# Patient Record
Sex: FEMALE | Race: WHITE | NOT HISPANIC OR LATINO | Employment: OTHER | URBAN - NONMETROPOLITAN AREA
[De-identification: names, ages, dates, MRNs, and addresses within clinical notes are randomized per-mention and may not be internally consistent; named-entity substitution may affect disease eponyms.]

---

## 2017-07-10 ENCOUNTER — TELEPHONE (OUTPATIENT)
Dept: CARDIOLOGY | Facility: CLINIC | Age: 57
End: 2017-07-10

## 2017-07-11 ENCOUNTER — OFFICE VISIT (OUTPATIENT)
Dept: CARDIOLOGY | Facility: CLINIC | Age: 57
End: 2017-07-11

## 2017-07-11 VITALS
HEART RATE: 54 BPM | BODY MASS INDEX: 26.67 KG/M2 | DIASTOLIC BLOOD PRESSURE: 85 MMHG | OXYGEN SATURATION: 98 % | HEIGHT: 64 IN | WEIGHT: 156.2 LBS | SYSTOLIC BLOOD PRESSURE: 139 MMHG

## 2017-07-11 DIAGNOSIS — I25.10 CORONARY ARTERY DISEASE INVOLVING NATIVE CORONARY ARTERY OF NATIVE HEART WITHOUT ANGINA PECTORIS: Primary | ICD-10-CM

## 2017-07-11 DIAGNOSIS — I10 ESSENTIAL HYPERTENSION: ICD-10-CM

## 2017-07-11 PROCEDURE — 99213 OFFICE O/P EST LOW 20 MIN: CPT | Performed by: NURSE PRACTITIONER

## 2017-07-11 RX ORDER — ASPIRIN 325 MG
325 TABLET, DELAYED RELEASE (ENTERIC COATED) ORAL DAILY
Refills: 5 | COMMUNITY
Start: 2017-06-07 | End: 2018-02-20 | Stop reason: DRUGHIGH

## 2017-07-11 NOTE — PATIENT INSTRUCTIONS
Atherosclerosis  Atherosclerosis is narrowing and hardening of the blood vessels (arteries). Arteries are tubes that carry blood that contains oxygen from the heart to all parts of the body. Arteries can become narrow or clogged with a buildup of fat, cholesterol, calcium, or other substances (plaque). Plaque decreases the amount of blood that can flow through the artery.  Atherosclerosis can affect any artery in the body, including:  · Heart arteries (coronary artery disease), which may cause heart attack.  · Brain arteries, which may cause stroke.  · Leg, arm, and pelvis arteries (peripheral artery disease), which may cause pain and numbness.  · Kidney arteries, which may cause kidney (renal) failure.  Treatment may slow the disease and prevent further damage to the heart, brain, peripheral arteries, and kidneys.  CAUSES  Atherosclerosis develops when plaque forms in an artery. This damages the inside wall of the artery. Over time, the plaque grows and hardens. It may break through the artery wall. This causes a blood clot to form over the break, which narrows the artery more. The clot may also break loose and travel to other arteries, causing more damage.  RISK FACTORS  This condition is more likely to develop in people who:  · Are middle age or older.  · Have a family history of atherosclerosis.  · Have high cholesterol.  · Have high blood fats (triglycerides).  · Have diabetes.  · Are overweight.  · Smoke tobacco.  · Do not exercise enough.  · Have a substance in the blood that indicates increased levels of inflammation in the body (C-reactive protein, or CRP).  · Have sleep apnea.  · Are stressed.  · Drink too much alcohol.  SYMPTOMS  This condition may not cause any symptoms. If you do have symptoms, they are caused by damage to an area of your body that is not getting enough blood. The following symptoms are possible:  · Coronary artery disease may cause chest pain and shortness of breath.  · Decreased blood  "supply to your brain may cause a stroke. Signs and symptoms of stroke may include sudden:    Weakness on one side of the body.    Confusion.    Changes in vision.    Inability to speak or understand speech.    Loss of balance, coordination, or ability to walk.    Severe headache.    Loss of consciousness.  · Peripheral artery disease may cause pain and numbness, often in the legs and hips.  · Renal failure may cause fatigue, nausea, swelling, and itchy skin.  DIAGNOSIS  This condition is diagnosed based on your medical history and a physical exam. During the exam, your health care provider will check your pulses and listen for a \"whooshing\" sound over your arteries (bruit). You may have tests, such as:  · Blood tests to check your levels of cholesterol, triglycerides, and CRP.  · Electrocardiogram (ECG) to check for heart damage.  · Chest X-ray to see if your heart is enlarged, which is a sign of heart failure.  · Stress test to see how your heart reacts to exercise.  · Echocardiogram to get images of the inside of your heart.  · Ankle-Brachial index to compare blood pressure in your arms to blood pressure in your ankles.  · Ultrasound of your peripheral arteries to check blood flow.  · CT scan to check for damage to your heart or brain.  · X-rays of blood vessels after dye has been injected (angiogram) to check blood flow.  TREATMENT  Treatment starts with lifestyle changes, which may include:  · Changing your diet.  · Losing weight.  · Reducing stress.  · Exercising and being more physically active.  · Not smoking.  You also may need medicine to:  · Lower triglycerides and cholesterol.  · Lower and control blood pressure.  · Prevent blood clots.  · Lower inflammation in your body.  · Lower and control your blood sugar.  Sometimes, surgery is needed to remove plaque, widen your artery, or create a new path for your blood (bypass). Surgical treatment may include:  · Removing plaque from an artery " (endarterectomy).  · Opening a narrowed heart artery (angioplasty).  · Heart or peripheral artery bypass graft surgery.  HOME CARE INSTRUCTIONS  Lifestyle  · Eat a heart-healthy diet. Talk to your health care provider or a diet specialist (dietitian) if you need help. A heart-healthy diet includes:    Limiting unhealthy fats and increasing healthy fats. Some examples of healthy fats are olive oil and canola oil.    Eating plant-based foods, such as fruits, vegetables, nuts, legumes, and whole grains.  · Follow an exercise program as told by your health care provider.  · Maintain a healthy weight. Lose weight if directed by your health care provider.  · Rest when you are tired.  · Learn to manage your stress.  · Do not use any tobacco products, such as cigarettes, chewing tobacco, and e-cigarettes. If you need help quitting, ask your health care provider.  · Limit alcohol intake to no more than 1 drink a day for nonpregnant women and 2 drinks a day for men. One drink equals 12 oz of beer, 5 oz of wine, or 1½ oz of hard liquor.  · Do not abuse drugs.  General Instructions  · Take over-the-counter and prescription medicines only as told by your health care provider.  · Manage other health conditions as told by your health care provider.  · Keep all follow-up visits as told by your health care provider. This is important.  SEEK MEDICAL CARE IF:  · You have chest pain or discomfort. This includes squeezing chest pain that may feel like indigestion (angina).  · You have shortness of breath.  · You have an irregular heartbeat.  · You have unexplained fatigue.  · You have unexplained pain or numbness in an arm, leg, or hip.  · You have nausea, swelling of your hands or feet, and itchy skin.  SEEK IMMEDIATE MEDICAL CARE IF:  · You have symptoms of a heart attack, such as:    Chest pain.    Shortness of breath.    Pain in your neck, jaw, arms, back, or stomach.    Cold sweat.    Nausea.    Light-headedness.  · You have  symptoms of a stroke, such as sudden:    Weakness on one side of your body.    Confusion.    Changes in vision.    Inability to speak or understand speech.    Loss of balance, coordination, or ability to walk.    Severe headache.    Loss of consciousness.  These symptoms may represent a serious problem that is an emergency. Do not wait to see if the symptoms will go away. Get medical help right away. Call your local emergency services (911 in the [place] Do not drive yourself to the hospital.     This information is not intended to replace advice given to you by your health care provider. Make sure you discuss any questions you have with your health care provider.     Document Released: 03/09/2005 Document Revised: 04/10/2017 Document Reviewed: 11/07/2016  [employer] Interactive Patient Education ©2017 [employer]  Heart-Healthy Eating Plan  Many factors influence your heart health, including eating and exercise habits. Heart (coronary) risk increases with abnormal blood fat (lipid) levels. Heart-healthy meal planning includes limiting unhealthy fats, increasing healthy fats, and making other small dietary changes. This includes maintaining a healthy body weight to help keep lipid levels within a normal range.  WHAT IS MY PLAN?   Your health care provider recommends that you:  · Get no more than _________% of the total calories in your daily diet from fat.  · Limit your intake of saturated fat to less than _________% of your total calories each day.  · Limit the amount of cholesterol in your diet to less than _________ mg per day.  WHAT TYPES OF FAT SHOULD I CHOOSE?  · Choose healthy fats more often. Choose monounsaturated and polyunsaturated fats, such as olive oil and canola oil, flaxseeds, walnuts, almonds, and seeds.  · Eat more omega-3 fats. Good choices include salmon, mackerel, sardines, tuna, flaxseed oil, and ground flaxseeds. Aim to eat fish at least two times each week.  · Limit saturated fats. Saturated  "fats are primarily found in animal products, such as meats, butter, and cream. Plant sources of saturated fats include palm oil, palm kernel oil, and coconut oil.  · Avoid foods with partially hydrogenated oils in them. These contain trans fats. Examples of foods that contain trans fats are stick margarine, some tub margarines, cookies, crackers, and other baked goods.  WHAT GENERAL GUIDELINES DO I NEED TO FOLLOW?  · Check food labels carefully to identify foods with trans fats or high amounts of saturated fat.  · Fill one half of your plate with vegetables and green salads. Eat 4-5 servings of vegetables per day. A serving of vegetables equals 1 cup of raw leafy vegetables, ½ cup of raw or cooked cut-up vegetables, or ½ cup of vegetable juice.  · Fill one fourth of your plate with whole grains. Look for the word \"whole\" as the first word in the ingredient list.  · Fill one fourth of your plate with lean protein foods.  · Eat 4-5 servings of fruit per day. A serving of fruit equals one medium whole fruit, ¼ cup of dried fruit, ½ cup of fresh, frozen, or canned fruit, or ½ cup of 100% fruit juice.  · Eat more foods that contain soluble fiber. Examples of foods that contain this type of fiber are apples, broccoli, carrots, beans, peas, and barley. Aim to get 20-30 g of fiber per day.  · Eat more home-cooked food and less restaurant, buffet, and fast food.  · Limit or avoid alcohol.  · Limit foods that are high in starch and sugar.  · Avoid fried foods.  · Cook foods by using methods other than frying. Baking, boiling, grilling, and broiling are all great options. Other fat-reducing suggestions include:    Removing the skin from poultry.    Removing all visible fats from meats.    Skimming the fat off of stews, soups, and gravies before serving them.    Steaming vegetables in water or broth.  · Lose weight if you are overweight. Losing just 5-10% of your initial body weight can help your overall health and prevent " diseases such as diabetes and heart disease.  · Increase your consumption of nuts, legumes, and seeds to 4-5 servings per week. One serving of dried beans or legumes equals ½ cup after being cooked, one serving of nuts equals 1½ ounces, and one serving of seeds equals ½ ounce or 1 tablespoon.  · You may need to monitor your salt (sodium) intake, especially if you have high blood pressure. Talk with your health care provider or dietitian to get more information about reducing sodium.  WHAT FOODS CAN I EAT?  Grains  Breads, including Serbian, white, vianca, wheat, raisin, rye, oatmeal, and Italian. Tortillas that are neither fried nor made with lard or trans fat. Low-fat rolls, including hotdog and hamburger buns and English muffins. Biscuits. Muffins. Waffles. Pancakes. Light popcorn. Whole-grain cereals. Flatbread. [place] toast. Pretzels. Breadsticks. Rusks. Low-fat snacks and crackers, including oyster, saltine, matzo, selin, animal, and rye. Rice and pasta, including brown rice and those that are made with whole wheat.  Vegetables  All vegetables.  Fruits  All fruits, but limit coconut.  Meats and Other Protein Sources  Lean, well-trimmed beef, veal, pork, and lamb. Chicken and turkey without skin. All fish and shellfish. Wild duck, rabbit, pheasant, and venison. Egg whites or low-cholesterol egg substitutes. Dried beans, peas, lentils, and tofu. Seeds and most nuts.  Dairy  Low-fat or nonfat cheeses, including ricotta, string, and mozzarella. Skim or 1% milk that is liquid, powdered, or evaporated. Buttermilk that is made with low-fat milk. Nonfat or low-fat yogurt.  Beverages  Mineral water. Diet carbonated beverages.  Sweets and Desserts  Sherbets and fruit ices. Honey, jam, marmalade, jelly, and syrups. Meringues and gelatins. Pure sugar candy, such as hard candy, jelly beans, gumdrops, mints, marshmallows, and small amounts of dark chocolate. Jose food cake.  Eat all sweets and desserts in moderation.  Fats and  Oils  Nonhydrogenated (trans-free) margarines. Vegetable oils, including soybean, sesame, sunflower, olive, peanut, safflower, corn, canola, and cottonseed. Salad dressings or mayonnaise that are made with a vegetable oil. Limit added fats and oils that you use for cooking, baking, salads, and as spreads.  Other  Cocoa powder. Coffee and tea. All seasonings and condiments.  The items listed above may not be a complete list of recommended foods or beverages. Contact your dietitian for more options.  WHAT FOODS ARE NOT RECOMMENDED?  Grains  Breads that are made with saturated or trans fats, oils, or whole milk. Croissants. Butter rolls. Cheese breads. Sweet rolls. Donuts. Buttered popcorn. Chow mein noodles. High-fat crackers, such as cheese or butter crackers.  Meats and Other Protein Sources  Fatty meats, such as hotdogs, short ribs, sausage, spareribs, mo, ribeye roast or steak, and mutton. High-fat deli meats, such as salami and bologna. Caviar. Domestic duck and goose. Organ meats, such as kidney, liver, sweetbreads, brains, gizzard, chitterlings, and heart.  Dairy  Cream, sour cream, cream cheese, and creamed cottage cheese. Whole milk cheeses, including blue (trey), Swift Lalo, Brie, Ashu, American, Havarti, Swiss, cheddar, Camembert, and Alvin.  Whole or 2% milk that is liquid, evaporated, or condensed. Whole buttermilk. Cream sauce or high-fat cheese sauce. Yogurt that is made from whole milk.  Beverages  Regular sodas and drinks with added sugar.  Sweets and Desserts  Frosting. Pudding. Cookies. Cakes other than yoshi food cake. Candy that has milk chocolate or white chocolate, hydrogenated fat, butter, coconut, or unknown ingredients. Buttered syrups. Full-fat ice cream or ice cream drinks.  Fats and Oils  Gravy that has suet, meat fat, or shortening. Cocoa butter, hydrogenated oils, palm oil, coconut oil, palm kernel oil. These can often be found in baked products, candy, fried foods, nondairy  creamers, and whipped toppings. Solid fats and shortenings, including mo fat, salt pork, lard, and butter. Nondairy cream substitutes, such as coffee creamers and sour cream substitutes. Salad dressings that are made of unknown oils, cheese, or sour cream.  The items listed above may not be a complete list of foods and beverages to avoid. Contact your dietitian for more information.     This information is not intended to replace advice given to you by your health care provider. Make sure you discuss any questions you have with your health care provider.     Document Released: 09/26/2009 Document Revised: 01/08/2016 Document Reviewed: 06/11/2015  [employer] Interactive Patient Education ©2017 [employer]

## 2017-07-11 NOTE — PROGRESS NOTES
Subjective     Chief Complaint: Follow-up and Coronary Artery Disease    History of Present Illness   Cassy Andrew is a 57 y.o. female who presents for follow up for ASCVD.  She had an MI with incomplete LBBB in May 2015, subsequent LCA stent, performed by Dr Ashley.   Echo revealed EF of 55-60% s/p stenting.  She has a history of hypertension which is stable.  She denies CP at rest and CP with exertion.  She denies SOA, HARVEY.  She denies episodes of dizziness.  She denies swelling.  She denies blood in stool.  She exercises regularly, walking 2-4 miles daily and working out at the gym with weights.  She reports taking her medication as directed.      She has come to the office today with a copy of her most recent labs ordered by her PCP on March 2, 2017.  Her H/H was 14.1/41.9.  Lipid panel shows , Triglycerides 147, HDL 49, LDL 77.  Her BUN/Creatinine was 17/1.04.  Ms Andrew's fasting blood glucose was 121 and her A1c was 6.3.  According to Ms Andrew, she asked her PCP to wait to be placed on Metformin.  She wants to see if an increase in exercise, diet change, and decrease in stress will bring the glucose numbers down.    See ROS      Current Outpatient Prescriptions:   •  atorvastatin (LIPITOR) 40 MG tablet, Take 1 tablet by mouth Daily., Disp: 30 tablet, Rfl: 11  •  clopidogrel (PLAVIX) 75 MG tablet, Take 1 tablet by mouth Daily., Disp: 30 tablet, Rfl: 11  •  lisinopril (PRINIVIL,ZESTRIL) 10 MG tablet, Take 1 tablet by mouth Daily., Disp: 30 tablet, Rfl: 11  •  metoprolol tartrate (LOPRESSOR) 25 MG tablet, Take 1 tablet by mouth 2 (Two) Times a Day., Disp: 60 tablet, Rfl: 11  •  aspirin  MG tablet, Take 325 mg by mouth Daily., Disp: , Rfl: 5     The following portions of the patient's history were reviewed and updated as appropriate: allergies, current medications, past family history, past medical history, past social history, past surgical history and problem list.    Review of Systems    Constitutional: Negative for fatigue.   Respiratory: Negative for shortness of breath.    Cardiovascular: Negative for chest pain, palpitations and leg swelling.   Gastrointestinal: Negative for blood in stool.   Neurological: Negative for dizziness, syncope, weakness and light-headedness.   Hematological: Bruises/bleeds easily (Bruises more easily than before being placed on Plavix.  ).       Objective   Physical Exam   Constitutional: She appears well-developed and well-nourished.   HENT:   Head: Normocephalic and atraumatic.   Eyes: Pupils are equal, round, and reactive to light.   Neck: No JVD present.   Cardiovascular: Regular rhythm, S1 normal, S2 normal and intact distal pulses.  Bradycardia present.  Exam reveals no gallop and no friction rub.    No murmur heard.  Pulmonary/Chest: Effort normal and breath sounds normal. No respiratory distress. She has no wheezes. She has no rales.   Abdominal: Soft. She exhibits no mass. There is no tenderness. No hernia.   Skin: Skin is warm and dry.   Psychiatric: She has a normal mood and affect.       Procedures     5/13/2015 echo per Dr. Ashley  1.)  Normal LV cavity size, wall  ismotion and ejection fraction EF 55-60%.    2.)  No significant valvular disease   3.)  No intracardiac masses observed   4.)  No thrombus is visualized within the left ventricle   5.)  There is no pericardial effusion    5/16/2016 Nuclear Stress test per Dr. Ashley  1.)  Abnormal Lexiscan nuclear stress test  2.) There was a medium, moderate partially reversible defect in the basal anterior septal, mid anterior septal, apical septal segments.  3.) There was a small fixed moderate defect in the mid inferior septal segment.  4.) Gated imaging underpass stress conditions demonstrated mild hypokinesis of the mid inferior septal segment; moderate hypokinesis of the basal inferior septal segment.    Assessment/Plan   Problems Addressed this Visit        Cardiovascular and Mediastinum    CAD  (coronary artery disease) - Primary    Hypertension        Pt to continue on medications as prescribed.  Pt encouraged to continue exercise and diet modifications.  Defer pre-diabetes management to PCP.  May decrease ASA to 81 mg.    Follow up in 6 months

## 2017-10-09 RX ORDER — CLOPIDOGREL BISULFATE 75 MG/1
75 TABLET ORAL DAILY
Qty: 30 TABLET | Refills: 11 | Status: SHIPPED | OUTPATIENT
Start: 2017-10-09 | End: 2018-09-08 | Stop reason: SDUPTHER

## 2017-10-16 RX ORDER — ATORVASTATIN CALCIUM 40 MG/1
40 TABLET, FILM COATED ORAL DAILY
Qty: 30 TABLET | Refills: 11 | Status: SHIPPED | OUTPATIENT
Start: 2017-10-16 | End: 2018-09-08 | Stop reason: SDUPTHER

## 2017-11-02 RX ORDER — LISINOPRIL 10 MG/1
10 TABLET ORAL DAILY
Qty: 30 TABLET | Refills: 11 | Status: SHIPPED | OUTPATIENT
Start: 2017-11-02 | End: 2018-10-11 | Stop reason: SDUPTHER

## 2018-02-20 ENCOUNTER — OFFICE VISIT (OUTPATIENT)
Dept: CARDIOLOGY | Facility: CLINIC | Age: 58
End: 2018-02-20

## 2018-02-20 VITALS — BODY MASS INDEX: 24.7 KG/M2 | WEIGHT: 153.7 LBS | HEIGHT: 66 IN

## 2018-02-20 DIAGNOSIS — I10 ESSENTIAL HYPERTENSION: ICD-10-CM

## 2018-02-20 DIAGNOSIS — I25.10 CORONARY ARTERY DISEASE INVOLVING NATIVE CORONARY ARTERY OF NATIVE HEART WITHOUT ANGINA PECTORIS: Primary | ICD-10-CM

## 2018-02-20 PROCEDURE — 93000 ELECTROCARDIOGRAM COMPLETE: CPT | Performed by: NURSE PRACTITIONER

## 2018-02-20 PROCEDURE — 99213 OFFICE O/P EST LOW 20 MIN: CPT | Performed by: NURSE PRACTITIONER

## 2018-02-20 RX ORDER — ASPIRIN 81 MG/1
81 TABLET ORAL DAILY
COMMUNITY

## 2018-02-20 NOTE — PROGRESS NOTES
Subjective     Chief Complaint: Coronary Artery Disease and Hypertension    History of Present Illness   Cassy Andrew is a 57 y.o. female who presents for follow up for ASCVD.  She had an MI with incomplete LBBB in May 2015, subsequent LCA stent, performed by Dr Ashley.   Echo revealed EF of 55-60% s/p stenting.  She has a history of hypertension which is stable.  She denies CP at rest and CP with exertion.  She denies SOA, HARVEY.  She denies episodes of dizziness.  She denies swelling.  She denies blood in stool.  She exercises regularly, walking 2-4 miles daily and working out at the gym with weights.  She reports taking her medication as directed.      She has no complaints today.  She denies chest discomfort during her daily exercise routine.  She is compliant with her medications.      Current Outpatient Prescriptions:   •  aspirin 81 MG EC tablet, Take 81 mg by mouth Daily., Disp: , Rfl:   •  atorvastatin (LIPITOR) 40 MG tablet, Take 1 tablet by mouth Daily., Disp: 30 tablet, Rfl: 11  •  clopidogrel (PLAVIX) 75 MG tablet, Take 1 tablet by mouth Daily., Disp: 30 tablet, Rfl: 11  •  lisinopril (PRINIVIL,ZESTRIL) 10 MG tablet, Take 1 tablet by mouth Daily., Disp: 30 tablet, Rfl: 11  •  metoprolol tartrate (LOPRESSOR) 25 MG tablet, Take 1 tablet by mouth 2 (Two) Times a Day., Disp: 60 tablet, Rfl: 11     The following portions of the patient's history were reviewed and updated as appropriate: allergies, current medications, past family history, past medical history, past social history, past surgical history and problem list.    Review of Systems   Constitutional: Negative for activity change, appetite change and fatigue.   HENT: Negative for congestion and tinnitus.    Eyes: Negative for visual disturbance.   Respiratory: Negative for cough, chest tightness and shortness of breath.    Cardiovascular: Negative for chest pain, palpitations and leg swelling.   Gastrointestinal: Negative for blood in stool, nausea  "and vomiting.   Endocrine: Negative for cold intolerance, heat intolerance and polyuria.   Genitourinary: Negative for dysuria.   Musculoskeletal: Negative for myalgias and neck pain.   Skin: Negative for rash.   Neurological: Negative for dizziness, syncope, weakness and light-headedness.   Hematological: Does not bruise/bleed easily.   Psychiatric/Behavioral: Negative for confusion and sleep disturbance.       Objective     Ht 167.6 cm (66\")  Wt 69.7 kg (153 lb 11.2 oz)  BMI 24.81 kg/m2    Physical Exam   Constitutional: She appears well-developed and well-nourished.   HENT:   Head: Normocephalic and atraumatic.   Eyes: Pupils are equal, round, and reactive to light.   Neck: No JVD present.   Cardiovascular: Normal rate, regular rhythm and intact distal pulses.  Exam reveals no gallop and no friction rub.    No murmur heard.  Pulses:       Dorsalis pedis pulses are 2+ on the right side, and 2+ on the left side.        Posterior tibial pulses are 2+ on the right side, and 2+ on the left side.   No lower extremity edema   Pulmonary/Chest: Effort normal and breath sounds normal. No respiratory distress. She has no wheezes. She has no rales.   Abdominal: Soft. She exhibits no mass. There is no tenderness. No hernia.   Skin: Skin is warm and dry.   Psychiatric: She has a normal mood and affect.   Vitals reviewed.        ECG 12 Lead  Date/Time: 2/20/2018 11:12 AM  Performed by: DAVID FRANCIS  Authorized by: DAVID FRANCIS   Comparison: compared with previous ECG from 5/16/2016  Similar to previous ECG  Comments: ECG today shows sinus rhythm with normal interval.  Left bundle branch block present.  Abnormal EKG  QTc 420              Assessment/Plan       Cassy WHEELER was seen today for coronary artery disease and hypertension.    Diagnoses and all orders for this visit:    Coronary artery disease involving native coronary artery of native heart without angina pectoris  -     ECG 12 Lead     Continue current " medications   Antiplatelets: Aspirin 81 mg and Plavix 75 mg   Beta blocker: Metoprolol tartrate 25 mg twice a day   ACE inhibitor: Lisinopril 10 mg daily   Reinforced lifestyle modification as indicated     Essential hypertension  -     ECG 12 Lead     Continue current medications, lisinopril and metoprolol tartrate    Requested patient bring labs from PCP office.  She expects to have them drawn sometime next month.    Return to clinic in 6 months.               SHANT Sullivan

## 2018-04-09 ENCOUNTER — TELEPHONE (OUTPATIENT)
Dept: CARDIOLOGY | Facility: CLINIC | Age: 58
End: 2018-04-09

## 2018-04-09 NOTE — TELEPHONE ENCOUNTER
Called patient and told her per Vandana Kruse, her labs were received from her PCP office and that they looked really good.     Patient aware and understands.

## 2018-04-09 NOTE — TELEPHONE ENCOUNTER
----- Message from SHANT Degroot sent at 4/6/2018  2:43 PM EDT -----  Please call Ms Andrew and tell her we received her labs from her PCP office and her labs are really good.    Thanks, Vandana

## 2018-09-10 RX ORDER — CLOPIDOGREL BISULFATE 75 MG/1
TABLET ORAL
Qty: 30 TABLET | Refills: 1 | Status: SHIPPED | OUTPATIENT
Start: 2018-09-10 | End: 2018-10-08

## 2018-09-10 RX ORDER — ATORVASTATIN CALCIUM 40 MG/1
TABLET, FILM COATED ORAL
Qty: 30 TABLET | Refills: 1 | Status: SHIPPED | OUTPATIENT
Start: 2018-09-10 | End: 2018-11-15 | Stop reason: SDUPTHER

## 2018-09-10 NOTE — TELEPHONE ENCOUNTER
Called patient to confirm that she does want RX's sent to [place] pharmacy and also if she was seeing Cardiology in [place] in October. She states that she is; she has moved. I told her that was what I was wondering, I just wanted to make sure I was sending in to the appropriate pharmacy. I told her I would send in a 30 day supply for her meds with 1 additional refill to insure she didn't run out of meds until that appointment. She is aware and understands that the other refills should come from her new cardiology office.

## 2018-10-08 ENCOUNTER — OFFICE VISIT (OUTPATIENT)
Dept: CARDIOLOGY | Facility: CLINIC | Age: 58
End: 2018-10-08

## 2018-10-08 VITALS
DIASTOLIC BLOOD PRESSURE: 78 MMHG | HEART RATE: 54 BPM | SYSTOLIC BLOOD PRESSURE: 120 MMHG | WEIGHT: 158 LBS | BODY MASS INDEX: 25.39 KG/M2 | HEIGHT: 66 IN

## 2018-10-08 DIAGNOSIS — I10 ESSENTIAL HYPERTENSION: ICD-10-CM

## 2018-10-08 DIAGNOSIS — E78.2 MIXED HYPERLIPIDEMIA: Chronic | ICD-10-CM

## 2018-10-08 DIAGNOSIS — I44.7 LBBB (LEFT BUNDLE BRANCH BLOCK): ICD-10-CM

## 2018-10-08 DIAGNOSIS — I21.21 ST ELEVATION MYOCARDIAL INFARCTION INVOLVING LEFT CIRCUMFLEX CORONARY ARTERY (HCC): Chronic | ICD-10-CM

## 2018-10-08 DIAGNOSIS — R00.1 BRADYCARDIA: ICD-10-CM

## 2018-10-08 DIAGNOSIS — I25.10 CORONARY ARTERY DISEASE INVOLVING NATIVE CORONARY ARTERY OF NATIVE HEART WITHOUT ANGINA PECTORIS: Primary | ICD-10-CM

## 2018-10-08 DIAGNOSIS — Z95.5 S/P DRUG ELUTING CORONARY STENT PLACEMENT: Chronic | ICD-10-CM

## 2018-10-08 PROCEDURE — 99204 OFFICE O/P NEW MOD 45 MIN: CPT | Performed by: INTERNAL MEDICINE

## 2018-10-08 PROCEDURE — 93000 ELECTROCARDIOGRAM COMPLETE: CPT | Performed by: INTERNAL MEDICINE

## 2018-10-08 RX ORDER — METOPROLOL SUCCINATE 50 MG/1
50 TABLET, EXTENDED RELEASE ORAL DAILY
Qty: 90 TABLET | Refills: 3 | Status: SHIPPED | OUTPATIENT
Start: 2018-10-08 | End: 2019-09-26 | Stop reason: SDUPTHER

## 2018-10-08 RX ORDER — LISINOPRIL 10 MG/1
TABLET ORAL
Qty: 30 TABLET | Refills: 0 | OUTPATIENT
Start: 2018-10-08

## 2018-10-08 NOTE — TELEPHONE ENCOUNTER
Patient was seen in office by Trent Patel III today, I see in patient's chart. He has discontinued her Clopidogrel 75 MG and Metoprolol Tartrate 25 MG and added in Metoprolol Succ. 50 MG daily.     I do not see any mention of the Lisinopril 10 MG as to if there were any changes made or if she is to remain on this medication. Since she has moved and is now seeing this provider could someone please check and see if he wanted her to stay on this and if so please send it in to the local pharmacy there.     Thanks

## 2018-10-08 NOTE — PROGRESS NOTES
Subjective:     Encounter Date:10/08/2018      Patient ID: Cassy Andrew is a 58 y.o. female.    Chief Complaint: CAD  History of Present Illness    This is a pleasant 58-year-old female who comes in for initial evaluation.  She recently moved to [place].  She previously lived in [facility].    She has a history of CAD.  She myocardial infarction with a left circumflex stent placed May 2015.  Echocardiogram afterwards showed an ejection fraction of 55-60 percent.  She has a history of hypertension and is on medical therapy.  She has a history of hyperlipidemia on lipid-lowering therapy.  She has been on chronic Plavix therapy ever since her intervention.    Her cardiac catheterization was performed in May 2011 2015.  This showed normal left main, normal LAD with a single diagonal, and complete occlusion of the mid circumflex there was treated with a drug eluding stent.  No other disease was seen in the circumflex.  Right coronary artery was a small dominant vessel with no disease seen.    She is very active.  She walks 2-4 miles daily.  She also does exercise at the gym 2 or 3 days a week.  She has not had any cardiac symptoms.This patient denies any chest pain, pressure, tightness, squeezing, or heartburn.  This patient has not experienced any feeling of palpitations, tachycardia or heart racing and no presyncope or syncope.  There has not been any problems with dizziness or lightheadedness.  There has not been any orthopnea or PND, and no problems with lower extremity edema.  This patient denies any shortness of breath at rest or with activity and has not had any wheezing.  This patient has not had any problems with unexplained nausea or vomiting. The patient has continued to perform daily activities of living without any specific problem or change in the level of activity.  This patient has not been recently hospitalized for any reason.    She does note that on her current medical regimen her heart  rate is usually in the 50s.    The following portions of the patient's history were reviewed and updated as appropriate: allergies, current medications, past family history, past medical history, past social history, past surgical history and problem list.    Past Medical History:   Diagnosis Date   • CAD (coronary artery disease)    • CVD (cardiovascular disease)    • Heart attack (CMS/HCC)    • Hyperlipidemia    • Hypertension    • Myocardial infarction (CMS/HCC)    • S/P drug eluting coronary stent placement 10/8/2018       Past Surgical History:   Procedure Laterality Date   • CORONARY STENT PLACEMENT         Social History     Social History   • Marital status: Single     Spouse name: N/A   • Number of children: N/A   • Years of education: N/A     Occupational History   • Not on file.     Social History Main Topics   • Smoking status: Never Smoker   • Smokeless tobacco: Never Used      Comment: caff use   • Alcohol use No   • Drug use: No   • Sexual activity: Not on file     Other Topics Concern   • Not on file     Social History Narrative   • No narrative on file       Review of Systems   Constitution: Negative for chills, decreased appetite, fever and night sweats.   HENT: Negative for ear discharge, ear pain, hearing loss, nosebleeds and sore throat.    Eyes: Negative for blurred vision, double vision and pain.   Cardiovascular: Negative for cyanosis.   Respiratory: Negative for hemoptysis and sputum production.    Endocrine: Negative for cold intolerance and heat intolerance.   Hematologic/Lymphatic: Negative for adenopathy.   Skin: Negative for dry skin, itching, nail changes, rash and suspicious lesions.   Musculoskeletal: Negative for arthritis, gout, muscle cramps, muscle weakness, myalgias and neck pain.   Gastrointestinal: Negative for anorexia, bowel incontinence, constipation, diarrhea, dysphagia, hematemesis and jaundice.   Genitourinary: Negative for bladder incontinence, dysuria, flank pain,  "frequency, hematuria and nocturia.   Neurological: Negative for focal weakness, numbness, paresthesias and seizures.   Psychiatric/Behavioral: Negative for altered mental status, hallucinations, hypervigilance, suicidal ideas and thoughts of violence.   Allergic/Immunologic: Negative for persistent infections.         ECG 12 Lead  Date/Time: 10/8/2018 11:55 AM  Performed by: BOYD DICKENS III.  Authorized by: BOYD DICKENS III   Comparison: compared with previous ECG   Similar to previous ECG  Rhythm: sinus rhythm  Rate: normal  Conduction: left bundle branch block  QRS axis: left  Other: no other findings  Clinical impression: abnormal ECG               Objective:     Vitals:    10/08/18 1110   BP: 120/78   Pulse: 54   Weight: 71.7 kg (158 lb)   Height: 167.6 cm (66\")         Physical Exam   Constitutional: She is oriented to person, place, and time. She appears well-developed and well-nourished. No distress.   HENT:   Head: Normocephalic and atraumatic.   Nose: Nose normal.   Mouth/Throat: Oropharynx is clear and moist.   Eyes: Pupils are equal, round, and reactive to light. Conjunctivae and EOM are normal. Right eye exhibits no discharge. Left eye exhibits no discharge.   Neck: Normal range of motion. Neck supple. No tracheal deviation present. No thyromegaly present.   Cardiovascular: Normal rate, regular rhythm, S1 normal, S2 normal, normal heart sounds and normal pulses.  Exam reveals no S3.    Pulmonary/Chest: Effort normal and breath sounds normal. No stridor. No respiratory distress. She exhibits no tenderness.   Abdominal: Soft. Bowel sounds are normal. She exhibits no distension and no mass. There is no tenderness. There is no rebound and no guarding.   Musculoskeletal: Normal range of motion. She exhibits no tenderness or deformity.   Lymphadenopathy:     She has no cervical adenopathy.   Neurological: She is alert and oriented to person, place, and time. She has normal reflexes.   Skin: Skin is warm " and dry. No rash noted. She is not diaphoretic. No erythema.   Psychiatric: She has a normal mood and affect. Thought content normal.       Lab Review:             Performed        Assessment:          Diagnosis Plan   1. Coronary artery disease involving native coronary artery of native heart without angina pectoris  metoprolol succinate XL (TOPROL-XL) 50 MG 24 hr tablet    ECG 12 Lead   2. Essential hypertension     3. S/P drug eluting coronary stent placement  ECG 12 Lead   4. ST elevation myocardial infarction involving left circumflex coronary artery (CMS/HCC)     5. Mixed hyperlipidemia     6. Bradycardia  ECG 12 Lead   7. LBBB (left bundle branch block)  ECG 12 Lead          Plan:       1. Coronary Artery Disease  Assessment  • The patient has no angina    Plan  • Lifestyle modifications discussed include adhering to a heart healthy diet, maintenance of a healthy weight, medication compliance, regular exercise and regular monitoring of cholesterol and blood pressure  • We will discontinue her clopidogrel.  Because of her bradycardia, I will also decrease her metoprolol from metoprolol tartrate 25 twice a day to metoprolol succinate 1 tab daily.    Subjective - Objective  • There is a history of past MI  • There has been a previous stent procedure using TRE  • Current antiplatelet therapy includes aspirin 81 mg    2.  Mixed hyperlipidemia-I reviewed her lipids from March and these demonstrated excellent control.  We will continue same.    3.  Reviewed diet and exercise recommendations    Thank you very much for allowing us to participate in the care of this pleasant patient.  Please don't hesitate to call if I can be of assistance in any way.      Current Outpatient Prescriptions:   •  aspirin 81 MG EC tablet, Take 81 mg by mouth Daily., Disp: , Rfl:   •  atorvastatin (LIPITOR) 40 MG tablet, TAKE 1 TABLET BY MOUTH EVERY DAY, Disp: 30 tablet, Rfl: 1  •  lisinopril (PRINIVIL,ZESTRIL) 10 MG tablet, Take 1 tablet  by mouth Daily., Disp: 30 tablet, Rfl: 11  •  metoprolol succinate XL (TOPROL-XL) 50 MG 24 hr tablet, Take 1 tablet by mouth Daily., Disp: 90 tablet, Rfl: 3

## 2018-10-11 RX ORDER — LISINOPRIL 10 MG/1
TABLET ORAL
Qty: 30 TABLET | Refills: 3 | Status: SHIPPED | OUTPATIENT
Start: 2018-10-11 | End: 2019-02-12 | Stop reason: SDUPTHER

## 2018-10-12 ENCOUNTER — TELEPHONE (OUTPATIENT)
Dept: CARDIOLOGY | Facility: CLINIC | Age: 58
End: 2018-10-12

## 2018-11-07 RX ORDER — ATORVASTATIN CALCIUM 40 MG/1
TABLET, FILM COATED ORAL
Qty: 30 TABLET | Refills: 0 | OUTPATIENT
Start: 2018-11-07

## 2018-11-13 RX ORDER — ATORVASTATIN CALCIUM 40 MG/1
TABLET, FILM COATED ORAL
Qty: 30 TABLET | Refills: 0 | OUTPATIENT
Start: 2018-11-13

## 2018-11-15 RX ORDER — ATORVASTATIN CALCIUM 40 MG/1
40 TABLET, FILM COATED ORAL DAILY
Qty: 30 TABLET | Refills: 3 | Status: SHIPPED | OUTPATIENT
Start: 2018-11-15 | End: 2019-03-10 | Stop reason: SDUPTHER

## 2018-12-04 ENCOUNTER — OFFICE VISIT (OUTPATIENT)
Dept: FAMILY MEDICINE CLINIC | Facility: CLINIC | Age: 58
End: 2018-12-04

## 2018-12-04 VITALS
SYSTOLIC BLOOD PRESSURE: 124 MMHG | WEIGHT: 162 LBS | DIASTOLIC BLOOD PRESSURE: 70 MMHG | BODY MASS INDEX: 26.99 KG/M2 | HEIGHT: 65 IN | OXYGEN SATURATION: 98 % | HEART RATE: 69 BPM

## 2018-12-04 DIAGNOSIS — Z78.0 POSTMENOPAUSAL: ICD-10-CM

## 2018-12-04 DIAGNOSIS — I25.83 CORONARY ARTERY DISEASE DUE TO LIPID RICH PLAQUE: Chronic | ICD-10-CM

## 2018-12-04 DIAGNOSIS — I25.10 CORONARY ARTERY DISEASE DUE TO LIPID RICH PLAQUE: Chronic | ICD-10-CM

## 2018-12-04 DIAGNOSIS — Z12.31 ENCOUNTER FOR SCREENING MAMMOGRAM FOR BREAST CANCER: ICD-10-CM

## 2018-12-04 DIAGNOSIS — I10 ESSENTIAL HYPERTENSION: Primary | ICD-10-CM

## 2018-12-04 DIAGNOSIS — Z23 NEED FOR IMMUNIZATION AGAINST INFLUENZA: ICD-10-CM

## 2018-12-04 DIAGNOSIS — E78.2 MIXED HYPERLIPIDEMIA: Chronic | ICD-10-CM

## 2018-12-04 PROCEDURE — 99213 OFFICE O/P EST LOW 20 MIN: CPT | Performed by: NURSE PRACTITIONER

## 2018-12-04 PROCEDURE — 90674 CCIIV4 VAC NO PRSV 0.5 ML IM: CPT | Performed by: NURSE PRACTITIONER

## 2018-12-04 PROCEDURE — 90471 IMMUNIZATION ADMIN: CPT | Performed by: NURSE PRACTITIONER

## 2018-12-04 NOTE — PATIENT INSTRUCTIONS
Discharge instructions    Outpatient mammogram DEXA scan bone density    Colonoscopy next year recommended    Outpatient fasting labs soon    Yearly recheck    Encourage healthy diet  Decrease processed sweets increase vegetables  Increase fiber  Healthy chicken fish  Olive oil  1400 bartolo a day not

## 2018-12-04 NOTE — PROGRESS NOTES
Subjective   Cassy Andrew is a 58 y.o. female.     Pleasant patient  Needs new PCP  History of CAD previous heart attack presently stable  Hypertension essential controlled  Mixed hyperlipidemia take statin  Takes aspirin as well  No problems  Does not smoke      Colonoscopy nine years ago  Normal no family history of due next year  Mammogram up-to-date  Dr Patel  Cardiology  Moved  [facility] 2015      P.hyst  Fibroids        Sh   Retired teacher  No tob          Hyperlipidemia   Pertinent negatives include no shortness of breath. Chest pain:           The following portions of the patient's history were reviewed and updated as appropriate: allergies, current medications, past family history, past medical history, past social history, past surgical history and problem list.    Review of Systems   Constitutional: Negative for chills, fatigue, fever and unexpected weight loss.   HENT: Negative.  Negative for trouble swallowing.    Eyes: Negative.    Respiratory: Negative for cough and shortness of breath.    Cardiovascular: Negative for palpitations and leg swelling. Chest pain:      Gastrointestinal: Negative for abdominal pain, blood in stool, constipation and diarrhea.   Genitourinary: Negative.    Musculoskeletal: Negative.    Skin: Negative.    Neurological: Negative.  Negative for dizziness, speech difficulty, weakness and confusion.   Psychiatric/Behavioral: Negative.        Objective   Physical Exam   Constitutional: She is oriented to person, place, and time. She appears well-developed and well-nourished.   HENT:   Head: Normocephalic.   Mouth/Throat: Oropharynx is clear and moist.   Eyes: Conjunctivae are normal. Pupils are equal, round, and reactive to light.   Neck: Neck supple.   Cardiovascular: Normal rate, regular rhythm and normal heart sounds. Exam reveals no friction rub.   No murmur heard.  carotidsclear   Pulmonary/Chest: Effort normal and breath sounds normal. No respiratory distress. She  has no wheezes.   Musculoskeletal: She exhibits no edema.   Neurological: She is alert and oriented to person, place, and time.   Skin: Skin is warm and dry.   Psychiatric: She has a normal mood and affect. Her behavior is normal. Judgment and thought content normal.   Vitals reviewed.        Assessment/Plan   Cassy WHEELER was seen today for hyperlipidemia.    Diagnoses and all orders for this visit:    Essential hypertension  -     Comprehensive Metabolic Panel; Future  -     Urinalysis With Microscopic If Indicated (No Culture) - Urine, Clean Catch; Future  -     Lipid Panel With LDL / HDL Ratio; Future    Mixed hyperlipidemia  -     Comprehensive Metabolic Panel; Future  -     Urinalysis With Microscopic If Indicated (No Culture) - Urine, Clean Catch; Future  -     Lipid Panel With LDL / HDL Ratio; Future    Coronary artery disease due to lipid rich plaque  -     Comprehensive Metabolic Panel; Future  -     Urinalysis With Microscopic If Indicated (No Culture) - Urine, Clean Catch; Future  -     Lipid Panel With LDL / HDL Ratio; Future    Encounter for screening mammogram for breast cancer  -     Mammo Screening Bilateral With CAD    Postmenopausal  -     DEXA Bone Density Axial    Need for immunization against influenza  -     Flucelvax Quad=>4Years ([id number])              Discharge instructions    Outpatient mammogram DEXA scan bone density    Colonoscopy next year recommended    Outpatient fasting labs soon    Yearly recheck    Encourage healthy diet  Decrease processed sweets increase vegetables  Increase fiber  Healthy chicken fish  Olive oil  1400 bartolo a day not

## 2018-12-11 ENCOUNTER — APPOINTMENT (OUTPATIENT)
Dept: LAB | Facility: HOSPITAL | Age: 58
End: 2018-12-11

## 2018-12-11 LAB
ALBUMIN SERPL-MCNC: 4.5 G/DL (ref 3.5–5.2)
ALBUMIN/GLOB SERPL: 1.7 G/DL
ALP SERPL-CCNC: 78 U/L (ref 39–117)
ALT SERPL W P-5'-P-CCNC: 27 U/L (ref 1–33)
ANION GAP SERPL CALCULATED.3IONS-SCNC: 9.1 MMOL/L
AST SERPL-CCNC: 23 U/L (ref 1–32)
BILIRUB SERPL-MCNC: 0.4 MG/DL (ref 0.1–1.2)
BILIRUB UR QL STRIP: NEGATIVE
BUN BLD-MCNC: 17 MG/DL (ref 6–20)
BUN/CREAT SERPL: 17.2 (ref 7–25)
CALCIUM SPEC-SCNC: 9.9 MG/DL (ref 8.6–10.5)
CHLORIDE SERPL-SCNC: 107 MMOL/L (ref 98–107)
CHOLEST SERPL-MCNC: 131 MG/DL (ref 0–200)
CLARITY UR: CLEAR
CO2 SERPL-SCNC: 26.9 MMOL/L (ref 22–29)
COLOR UR: YELLOW
CREAT BLD-MCNC: 0.99 MG/DL (ref 0.57–1)
GFR SERPL CREATININE-BSD FRML MDRD: 57 ML/MIN/1.73
GLOBULIN UR ELPH-MCNC: 2.7 GM/DL
GLUCOSE BLD-MCNC: 138 MG/DL (ref 65–99)
GLUCOSE UR STRIP-MCNC: NEGATIVE MG/DL
HDLC SERPL-MCNC: 53 MG/DL (ref 40–60)
HGB UR QL STRIP.AUTO: NEGATIVE
KETONES UR QL STRIP: NEGATIVE
LDLC SERPL CALC-MCNC: 59 MG/DL (ref 0–100)
LDLC/HDLC SERPL: 1.12 {RATIO}
LEUKOCYTE ESTERASE UR QL STRIP.AUTO: NEGATIVE
NITRITE UR QL STRIP: NEGATIVE
PH UR STRIP.AUTO: 5.5 [PH] (ref 5–8)
POTASSIUM BLD-SCNC: 4.6 MMOL/L (ref 3.5–5.2)
PROT SERPL-MCNC: 7.2 G/DL (ref 6–8.5)
PROT UR QL STRIP: NEGATIVE
SODIUM BLD-SCNC: 143 MMOL/L (ref 136–145)
SP GR UR STRIP: 1.02 (ref 1–1.03)
TRIGL SERPL-MCNC: 94 MG/DL (ref 0–150)
UROBILINOGEN UR QL STRIP: NORMAL
VLDLC SERPL-MCNC: 18.8 MG/DL (ref 5–40)

## 2018-12-11 PROCEDURE — 80053 COMPREHEN METABOLIC PANEL: CPT | Performed by: NURSE PRACTITIONER

## 2018-12-11 PROCEDURE — 36415 COLL VENOUS BLD VENIPUNCTURE: CPT | Performed by: NURSE PRACTITIONER

## 2018-12-11 PROCEDURE — 81003 URINALYSIS AUTO W/O SCOPE: CPT | Performed by: NURSE PRACTITIONER

## 2018-12-11 PROCEDURE — 80061 LIPID PANEL: CPT | Performed by: NURSE PRACTITIONER

## 2018-12-12 ENCOUNTER — RESULTS ENCOUNTER (OUTPATIENT)
Dept: FAMILY MEDICINE CLINIC | Facility: CLINIC | Age: 58
End: 2018-12-12

## 2018-12-12 DIAGNOSIS — E78.2 MIXED HYPERLIPIDEMIA: Chronic | ICD-10-CM

## 2018-12-12 DIAGNOSIS — I25.83 CORONARY ARTERY DISEASE DUE TO LIPID RICH PLAQUE: Chronic | ICD-10-CM

## 2018-12-12 DIAGNOSIS — I10 ESSENTIAL HYPERTENSION: ICD-10-CM

## 2018-12-12 DIAGNOSIS — I25.10 CORONARY ARTERY DISEASE DUE TO LIPID RICH PLAQUE: Chronic | ICD-10-CM

## 2018-12-14 ENCOUNTER — TELEPHONE (OUTPATIENT)
Dept: CARDIOLOGY | Facility: CLINIC | Age: 58
End: 2018-12-14

## 2018-12-14 NOTE — TELEPHONE ENCOUNTER
Just want to check that pt is unable to lower her dose of Atorvastatin.  Double checking since pt was inquiring about lower the dose or stopping

## 2018-12-14 NOTE — TELEPHONE ENCOUNTER
Pt had labs drawn at the request of Dr. Epley   These were drawn on 12/11/18.    Pt would like to know if she could come off the Atorvastatin or at least lower the dose.    Please Advise  Thank you :)

## 2018-12-27 ENCOUNTER — HOSPITAL ENCOUNTER (OUTPATIENT)
Dept: MAMMOGRAPHY | Facility: HOSPITAL | Age: 58
Discharge: HOME | End: 2018-12-27

## 2018-12-27 ENCOUNTER — HOSPITAL ENCOUNTER (OUTPATIENT)
Dept: BONE DENSITY | Facility: HOSPITAL | Age: 58
Discharge: HOME | End: 2018-12-27
Admitting: NURSE PRACTITIONER

## 2018-12-27 PROCEDURE — 77080 DXA BONE DENSITY AXIAL: CPT

## 2018-12-27 PROCEDURE — 77067 SCR MAMMO BI INCL CAD: CPT

## 2019-02-12 RX ORDER — LISINOPRIL 10 MG/1
TABLET ORAL
Qty: 30 TABLET | Refills: 3 | Status: SHIPPED | OUTPATIENT
Start: 2019-02-12 | End: 2019-06-14 | Stop reason: SDUPTHER

## 2019-03-11 RX ORDER — ATORVASTATIN CALCIUM 40 MG/1
40 TABLET, FILM COATED ORAL DAILY
Qty: 30 TABLET | Refills: 9 | Status: SHIPPED | OUTPATIENT
Start: 2019-03-11 | End: 2019-10-21

## 2019-03-11 NOTE — TELEPHONE ENCOUNTER
Lipid 12/11/18:    Total Cholesterol 0 - 200 mg/dL 131  158 CM   Triglycerides 0 - 150 mg/dL 94  98 CM   HDL Cholesterol 40 - 60 mg/dL 53  47 Abnormally low  R, CM   LDL Cholesterol  0 - 100 mg/dL 59  92 CM   VLDL Cholesterol 5 - 40 mg/dL 18.8  20 R   LDL/HDL Ratio  1.12       CMP: 12/1118:    Glucose 65 - 99 mg/dL 138 Abnormally high     BUN 6 - 20 mg/dL 17    Creatinine 0.57 - 1.00 mg/dL 0.99    Sodium 136 - 145 mmol/L 143    Potassium 3.5 - 5.2 mmol/L 4.6    Chloride 98 - 107 mmol/L 107    CO2 22.0 - 29.0 mmol/L 26.9    Calcium 8.6 - 10.5 mg/dL 9.9    Total Protein 6.0 - 8.5 g/dL 7.2    Albumin 3.50 - 5.20 g/dL 4.50    ALT (SGPT) 1 - 33 U/L 27    AST (SGOT) 1 - 32 U/L 23    Alkaline Phosphatase 39 - 117 U/L 78    Total Bilirubin 0.1 - 1.2 mg/dL 0.4    eGFR Non African Amer >60 mL/min/1.73 57 Abnormally low     Globulin gm/dL 2.7    A/G Ratio g/dL 1.7    BUN/Creatinine Ratio 7.0 - 25.0 17.2    Anion Gap mmol/L 9.1          Is it ok to refill?

## 2019-06-14 RX ORDER — LISINOPRIL 10 MG/1
TABLET ORAL
Qty: 30 TABLET | Refills: 2 | Status: SHIPPED | OUTPATIENT
Start: 2019-06-14 | End: 2019-09-22 | Stop reason: SDUPTHER

## 2019-09-23 RX ORDER — LISINOPRIL 10 MG/1
TABLET ORAL
Qty: 30 TABLET | Refills: 1 | Status: SHIPPED | OUTPATIENT
Start: 2019-09-23 | End: 2019-10-21 | Stop reason: SDUPTHER

## 2019-09-26 DIAGNOSIS — I25.10 CORONARY ARTERY DISEASE INVOLVING NATIVE CORONARY ARTERY OF NATIVE HEART WITHOUT ANGINA PECTORIS: ICD-10-CM

## 2019-09-26 RX ORDER — METOPROLOL SUCCINATE 50 MG/1
50 TABLET, EXTENDED RELEASE ORAL DAILY
Qty: 90 TABLET | Refills: 0 | Status: SHIPPED | OUTPATIENT
Start: 2019-09-26 | End: 2019-10-21

## 2019-10-21 ENCOUNTER — OFFICE VISIT (OUTPATIENT)
Dept: CARDIOLOGY | Facility: CLINIC | Age: 59
End: 2019-10-21

## 2019-10-21 VITALS
SYSTOLIC BLOOD PRESSURE: 118 MMHG | HEART RATE: 57 BPM | BODY MASS INDEX: 26.52 KG/M2 | HEIGHT: 66 IN | DIASTOLIC BLOOD PRESSURE: 80 MMHG | WEIGHT: 165 LBS

## 2019-10-21 DIAGNOSIS — E78.2 MIXED HYPERLIPIDEMIA: Chronic | ICD-10-CM

## 2019-10-21 DIAGNOSIS — I21.21 ST ELEVATION MYOCARDIAL INFARCTION INVOLVING LEFT CIRCUMFLEX CORONARY ARTERY (HCC): Chronic | ICD-10-CM

## 2019-10-21 DIAGNOSIS — I44.7 LBBB (LEFT BUNDLE BRANCH BLOCK): Primary | Chronic | ICD-10-CM

## 2019-10-21 DIAGNOSIS — I25.10 CORONARY ARTERY DISEASE INVOLVING NATIVE CORONARY ARTERY OF NATIVE HEART WITHOUT ANGINA PECTORIS: ICD-10-CM

## 2019-10-21 DIAGNOSIS — Z95.5 S/P DRUG ELUTING CORONARY STENT PLACEMENT: Chronic | ICD-10-CM

## 2019-10-21 DIAGNOSIS — I10 ESSENTIAL HYPERTENSION: ICD-10-CM

## 2019-10-21 PROCEDURE — 93000 ELECTROCARDIOGRAM COMPLETE: CPT | Performed by: INTERNAL MEDICINE

## 2019-10-21 PROCEDURE — 99213 OFFICE O/P EST LOW 20 MIN: CPT | Performed by: INTERNAL MEDICINE

## 2019-10-21 RX ORDER — ATORVASTATIN CALCIUM 40 MG/1
40 TABLET, FILM COATED ORAL DAILY
Qty: 90 TABLET | Refills: 3 | Status: SHIPPED | OUTPATIENT
Start: 2019-10-21 | End: 2019-12-26 | Stop reason: SDUPTHER

## 2019-10-21 RX ORDER — LISINOPRIL 10 MG/1
10 TABLET ORAL DAILY
Qty: 90 TABLET | Refills: 3 | Status: SHIPPED | OUTPATIENT
Start: 2019-10-21 | End: 2020-09-22

## 2019-10-21 RX ORDER — METOPROLOL SUCCINATE 25 MG/1
25 TABLET, EXTENDED RELEASE ORAL DAILY
Qty: 90 TABLET | Refills: 3 | Status: SHIPPED | OUTPATIENT
Start: 2019-10-21 | End: 2019-12-26 | Stop reason: SDUPTHER

## 2019-10-21 NOTE — PROGRESS NOTES
Subjective:     Encounter Date: 10/21/19        Patient ID: Cassy Andrew is a 59 y.o. female.    Chief Complaint: CAD  History of Present Illness    This is a pleasant 58-year-old female who comes in for f/u of her cardiac status.    It has been 1 year since her last visit.  She has been doing great without any complaints.  She is very active.  She walks 2-4 miles daily and exercises at the gym 2 or 3 days a week.  No cardiac symptoms.  She denies any chest pain, pressure, tightness, squeezing, or heartburn.  She has not experienced any feeling of palpitations, tachycardia or heart racing and no presyncope or syncope.  There have not been any problems with dizziness or lightheadedness.  There have not been any orthopnea or PND, and no problems with lower extremity edema.  She denies any shortness of breath at rest or with activity and has not had any wheezing.  She has not had any problems with unexplained nausea or vomiting. She has continued to perform daily activities of living without any specific problem or change in the level of activity.  She has not been recently hospitalized for any reason.  She  moved to [place] in 2018.  She previously lived in [facility].    She has a history of CAD.  She myocardial infarction with a left circumflex stent placed May 2015.  Echocardiogram afterwards showed an ejection fraction of 55-60 percent.  She has a history of hypertension and is on medical therapy.  She has a history of hyperlipidemia on lipid-lowering therapy.  She has been on chronic Plavix therapy ever since her intervention.    Her cardiac catheterization was performed in May 2011 2015.  This showed normal left main, normal LAD with a single diagonal, and complete occlusion of the mid circumflex there was treated with a drug eluding stent.  No other disease was seen in the circumflex.  Right coronary artery was a small dominant vessel with no disease seen.    The following portions of the  patient's history were reviewed and updated as appropriate: allergies, current medications, past family history, past medical history, past social history, past surgical history and problem list.    Past Medical History:   Diagnosis Date   • CAD (coronary artery disease)    • CVD (cardiovascular disease)    • Heart attack (CMS/HCC)    • Hyperlipidemia    • Hypertension    • LBBB (left bundle branch block) 10/8/2018   • Mixed hyperlipidemia 10/8/2018   • Myocardial infarction (CMS/[facility])    • S/P drug eluting coronary stent placement 10/8/2018   • ST elevation myocardial infarction involving left circumflex coronary artery (CMS/HCC) 10/8/2018       Past Surgical History:   Procedure Laterality Date   • CORONARY STENT PLACEMENT     • HYSTERECTOMY         Social History     Socioeconomic History   • Marital status: Single     Spouse name: Not on file   • Number of children: Not on file   • Years of education: Not on file   • Highest education level: Not on file   Tobacco Use   • Smoking status: Never Smoker   • Smokeless tobacco: Never Used   • Tobacco comment: caff use   Substance and Sexual Activity   • Alcohol use: No   • Drug use: No       Review of Systems   Constitution: Negative for chills, decreased appetite, fever and night sweats.   HENT: Negative for ear discharge, ear pain, hearing loss, nosebleeds and sore throat.    Eyes: Negative for blurred vision, double vision and pain.   Cardiovascular: Negative for cyanosis.   Respiratory: Negative for hemoptysis and sputum production.    Endocrine: Negative for cold intolerance and heat intolerance.   Hematologic/Lymphatic: Negative for adenopathy.   Skin: Negative for dry skin, itching, nail changes, rash and suspicious lesions.   Musculoskeletal: Negative for arthritis, gout, muscle cramps, muscle weakness, myalgias and neck pain.   Gastrointestinal: Negative for anorexia, bowel incontinence, constipation, diarrhea, dysphagia, hematemesis and jaundice.  "  Genitourinary: Negative for bladder incontinence, dysuria, flank pain, frequency, hematuria and nocturia.   Neurological: Negative for focal weakness, numbness, paresthesias and seizures.   Psychiatric/Behavioral: Negative for altered mental status, hallucinations, hypervigilance, suicidal ideas and thoughts of violence.   Allergic/Immunologic: Negative for persistent infections.         ECG 12 Lead  Date/Time: 10/21/2019 11:39 AM  Performed by: Naveen Patel III, MD  Authorized by: Naveen Patel III, MD   Comparison: compared with previous ECG   Similar to previous ECG  Rhythm: sinus rhythm  Rate: normal  Conduction: left bundle branch block  QRS axis: left  Other: no other findings    Clinical impression: abnormal EKG               Objective:     Vitals:    10/21/19 1107   BP: 118/80   Pulse: 57   Weight: 74.8 kg (165 lb)   Height: 167.6 cm (66\")         Physical Exam   Constitutional: She is oriented to person, place, and time. She appears well-developed and well-nourished. No distress.   HENT:   Head: Normocephalic and atraumatic.   Nose: Nose normal.   Mouth/Throat: Oropharynx is clear and moist.   Eyes: Conjunctivae and EOM are normal. Pupils are equal, round, and reactive to light. Right eye exhibits no discharge. Left eye exhibits no discharge.   Neck: Normal range of motion. Neck supple. No tracheal deviation present. No thyromegaly present.   Cardiovascular: Normal rate, regular rhythm, S1 normal, S2 normal, normal heart sounds and normal pulses. Exam reveals no S3.   Pulmonary/Chest: Effort normal and breath sounds normal. No stridor. No respiratory distress. She exhibits no tenderness.   Abdominal: Soft. Bowel sounds are normal. She exhibits no distension and no mass. There is no tenderness. There is no rebound and no guarding.   Musculoskeletal: Normal range of motion. She exhibits no tenderness or deformity.   Lymphadenopathy:     She has no cervical adenopathy.   Neurological: She is alert and " oriented to person, place, and time. She has normal reflexes.   Skin: Skin is warm and dry. No rash noted. She is not diaphoretic. No erythema.   Psychiatric: She has a normal mood and affect. Thought content normal.       Lab Review:             Performed        Assessment:          Diagnosis Plan   1. LBBB (left bundle branch block)     2. Coronary artery disease involving native coronary artery of native heart without angina pectoris  metoprolol succinate XL (TOPROL-XL) 25 MG 24 hr tablet    lisinopril (PRINIVIL,ZESTRIL) 10 MG tablet    ECG 12 Lead   3. Essential hypertension     4. ST elevation myocardial infarction involving left circumflex coronary artery (CMS/HCC)     5. Mixed hyperlipidemia     6. S/P drug eluting coronary stent placement            Plan:       1. Coronary Artery Disease  Assessment  • The patient has no angina    Plan  • Lifestyle modifications discussed include adhering to a heart healthy diet, maintenance of a healthy weight, medication compliance, regular exercise and regular monitoring of cholesterol and blood pressure  • We will discontinue her clopidogrel.  Because of her bradycardia, I will also decrease her metoprolol from metoprolol tartrate 25 twice a day to metoprolol succinate 1 tab daily.    Subjective - Objective  • There is a history of past MI  • There has been a previous stent procedure using TRE  • Current antiplatelet therapy includes aspirin 81 mg    2.  Mixed hyperlipidemia- she is scheduled to see you later this week.  Previous lab data showed excellent control of her lipids    3.  Reviewed diet and exercise recommendations    4.  Sinus bradycardia-patient is asymptomatic sinus bradycardia.  I think we can decrease her metoprolol succinate from 50 mg daily to 25 mg daily.    Thank you very much for allowing us to participate in the care of this pleasant patient.  Please don't hesitate to call if I can be of assistance in any way.      Current Outpatient Medications:    •  aspirin 81 MG EC tablet, Take 81 mg by mouth Daily., Disp: , Rfl:   •  atorvastatin (LIPITOR) 40 MG tablet, TAKE 1 TABLET BY MOUTH DAILY, Disp: 30 tablet, Rfl: 9  •  lisinopril (PRINIVIL,ZESTRIL) 10 MG tablet, TAKE 1 TABLET BY MOUTH EVERY DAY, Disp: 30 tablet, Rfl: 1  •  metoprolol succinate XL (TOPROL-XL) 50 MG 24 hr tablet, TAKE 1 TABLET BY MOUTH DAILY, Disp: 90 tablet, Rfl: 0

## 2019-10-23 ENCOUNTER — OFFICE VISIT (OUTPATIENT)
Dept: FAMILY MEDICINE CLINIC | Facility: CLINIC | Age: 59
End: 2019-10-23

## 2019-10-23 VITALS
SYSTOLIC BLOOD PRESSURE: 122 MMHG | OXYGEN SATURATION: 97 % | DIASTOLIC BLOOD PRESSURE: 80 MMHG | HEART RATE: 74 BPM | WEIGHT: 166 LBS | HEIGHT: 66 IN | BODY MASS INDEX: 26.68 KG/M2 | TEMPERATURE: 97.7 F

## 2019-10-23 DIAGNOSIS — I10 ESSENTIAL HYPERTENSION: ICD-10-CM

## 2019-10-23 DIAGNOSIS — I25.83 CORONARY ARTERY DISEASE DUE TO LIPID RICH PLAQUE: Chronic | ICD-10-CM

## 2019-10-23 DIAGNOSIS — Z12.31 ENCOUNTER FOR SCREENING MAMMOGRAM FOR BREAST CANCER: ICD-10-CM

## 2019-10-23 DIAGNOSIS — Z00.00 HEALTH MAINTENANCE EXAMINATION: Primary | ICD-10-CM

## 2019-10-23 DIAGNOSIS — I25.10 CORONARY ARTERY DISEASE DUE TO LIPID RICH PLAQUE: Chronic | ICD-10-CM

## 2019-10-23 DIAGNOSIS — Z23 NEED FOR IMMUNIZATION AGAINST INFLUENZA: ICD-10-CM

## 2019-10-23 DIAGNOSIS — E78.2 MIXED HYPERLIPIDEMIA: Chronic | ICD-10-CM

## 2019-10-23 DIAGNOSIS — M85.80 OSTEOPENIA, UNSPECIFIED LOCATION: ICD-10-CM

## 2019-10-23 DIAGNOSIS — R73.9 HYPERGLYCEMIA: ICD-10-CM

## 2019-10-23 DIAGNOSIS — E55.9 VITAMIN D DEFICIENCY: ICD-10-CM

## 2019-10-23 PROCEDURE — 99396 PREV VISIT EST AGE 40-64: CPT | Performed by: NURSE PRACTITIONER

## 2019-10-23 PROCEDURE — 90471 IMMUNIZATION ADMIN: CPT | Performed by: NURSE PRACTITIONER

## 2019-10-23 PROCEDURE — 90674 CCIIV4 VAC NO PRSV 0.5 ML IM: CPT | Performed by: NURSE PRACTITIONER

## 2019-10-23 NOTE — PROGRESS NOTES
Subjective   Cassy Andrew is a 59 y.o. female.     Pleasant patient is here for complete physical exam she has no acute problems doing well  Mixed hyperlipidemia for which she takes a statin we discussed goal LDL less than 70  Essential hypertension presently controlled compliant with her medication    Mild obesity she is trying to eat better lose weight    Elevated glucose including fasting glucose in the prediabetes to diabetes range  She has not formally been diagnosed with diabetes we discussed this in the office with her today  Her sugars have been mildly elevated since 2015  Family history of heart disease, she had a heart attack in 2015  I suspect she likely had prediabetes for some time prior to this as she has no other risk factors and does not smoke?    Retired schoolteacher stays active  Social history    No tobacco  As above  Family history  CAD      Hypertension   Pertinent negatives include no chest pain, palpitations or shortness of breath.        The following portions of the patient's history were reviewed and updated as appropriate: allergies, current medications, past family history, past medical history, past social history, past surgical history and problem list.    Review of Systems   Constitutional: Negative for chills, fatigue, fever and unexpected weight loss.   HENT: Negative.  Negative for trouble swallowing.    Eyes: Negative.    Respiratory: Negative for cough and shortness of breath.    Cardiovascular: Negative for chest pain, palpitations and leg swelling.   Gastrointestinal: Negative for abdominal pain and blood in stool.   Genitourinary: Negative.  Negative for pelvic pain.   Musculoskeletal: Negative.    Skin: Negative.    Neurological: Negative.  Negative for dizziness, speech difficulty, weakness and confusion.   Psychiatric/Behavioral: Negative.        Objective   Physical Exam   Constitutional: She is oriented to person, place, and time. She appears well-developed and  well-nourished.   HENT:   Head: Normocephalic.   Mouth/Throat: Oropharynx is clear and moist. No oropharyngeal exudate.   Eyes: Conjunctivae are normal. Pupils are equal, round, and reactive to light.   Neck: Neck supple. No JVD present. No thyromegaly present.   Cardiovascular: Normal rate, regular rhythm and normal heart sounds. Exam reveals no friction rub.   No murmur heard.  Carotids clear   Pulmonary/Chest: Effort normal and breath sounds normal. No respiratory distress. She has no wheezes.   Abdominal: Soft. Bowel sounds are normal. She exhibits no distension and no mass. There is no tenderness. There is no guarding. No hernia.   Musculoskeletal: She exhibits no edema or tenderness.   Lymphadenopathy:     She has no cervical adenopathy.   Neurological: She is alert and oriented to person, place, and time.   Skin: Skin is warm and dry.   Psychiatric: She has a normal mood and affect. Her behavior is normal. Judgment and thought content normal.   Vitals reviewed.        Assessment/Plan   Cassy WHEELER was seen today for annual exam and hypertension.    Diagnoses and all orders for this visit:    Health maintenance examination  -     CBC & Differential; Future  -     Comprehensive Metabolic Panel; Future  -     Lipid Panel With LDL / HDL Ratio; Future  -     TSH Rfx On Abnormal To Free T4; Future  -     Urinalysis With Microscopic If Indicated (No Culture) - Urine, Clean Catch; Future  -     Vitamin D 25 Hydroxy; Future  -     C-Peptide; Future  -     Insulin, Total; Future  -     Hemoglobin A1c; Future    Need for immunization against influenza  -     Flucelvax Quad=>4Years ([id number])  -     CBC & Differential; Future  -     Comprehensive Metabolic Panel; Future  -     Lipid Panel With LDL / HDL Ratio; Future  -     TSH Rfx On Abnormal To Free T4; Future  -     Urinalysis With Microscopic If Indicated (No Culture) - Urine, Clean Catch; Future  -     Vitamin D 25 Hydroxy; Future  -     C-Peptide; Future  -      Insulin, Total; Future  -     Hemoglobin A1c; Future    Coronary artery disease due to lipid rich plaque  -     CBC & Differential; Future  -     Comprehensive Metabolic Panel; Future  -     Lipid Panel With LDL / HDL Ratio; Future  -     TSH Rfx On Abnormal To Free T4; Future  -     Urinalysis With Microscopic If Indicated (No Culture) - Urine, Clean Catch; Future  -     Vitamin D 25 Hydroxy; Future  -     C-Peptide; Future  -     Insulin, Total; Future  -     Hemoglobin A1c; Future    Mixed hyperlipidemia  -     CBC & Differential; Future  -     Comprehensive Metabolic Panel; Future  -     Lipid Panel With LDL / HDL Ratio; Future  -     TSH Rfx On Abnormal To Free T4; Future  -     Urinalysis With Microscopic If Indicated (No Culture) - Urine, Clean Catch; Future  -     Vitamin D 25 Hydroxy; Future  -     C-Peptide; Future  -     Insulin, Total; Future  -     Hemoglobin A1c; Future    Essential hypertension  -     CBC & Differential; Future  -     Comprehensive Metabolic Panel; Future  -     Lipid Panel With LDL / HDL Ratio; Future  -     TSH Rfx On Abnormal To Free T4; Future  -     Urinalysis With Microscopic If Indicated (No Culture) - Urine, Clean Catch; Future  -     Vitamin D 25 Hydroxy; Future  -     C-Peptide; Future  -     Insulin, Total; Future  -     Hemoglobin A1c; Future    Osteopenia, unspecified location  -     CBC & Differential; Future  -     Comprehensive Metabolic Panel; Future  -     Lipid Panel With LDL / HDL Ratio; Future  -     TSH Rfx On Abnormal To Free T4; Future  -     Urinalysis With Microscopic If Indicated (No Culture) - Urine, Clean Catch; Future  -     Vitamin D 25 Hydroxy; Future  -     C-Peptide; Future  -     Insulin, Total; Future  -     Hemoglobin A1c; Future    Vitamin D deficiency  -     CBC & Differential; Future  -     Comprehensive Metabolic Panel; Future  -     Lipid Panel With LDL / HDL Ratio; Future  -     TSH Rfx On Abnormal To Free T4; Future  -     Urinalysis With  Microscopic If Indicated (No Culture) - Urine, Clean Catch; Future  -     Vitamin D 25 Hydroxy; Future  -     C-Peptide; Future  -     Insulin, Total; Future  -     Hemoglobin A1c; Future    Encounter for screening mammogram for breast cancer  -     Mammo Screening Bilateral With CAD  -     CBC & Differential; Future  -     Comprehensive Metabolic Panel; Future  -     Lipid Panel With LDL / HDL Ratio; Future  -     TSH Rfx On Abnormal To Free T4; Future  -     Urinalysis With Microscopic If Indicated (No Culture) - Urine, Clean Catch; Future  -     Vitamin D 25 Hydroxy; Future  -     C-Peptide; Future  -     Insulin, Total; Future  -     Hemoglobin A1c; Future    Hyperglycemia  -     CBC & Differential; Future  -     Comprehensive Metabolic Panel; Future  -     Lipid Panel With LDL / HDL Ratio; Future  -     TSH Rfx On Abnormal To Free T4; Future  -     Urinalysis With Microscopic If Indicated (No Culture) - Urine, Clean Catch; Future  -     Vitamin D 25 Hydroxy; Future  -     C-Peptide; Future  -     Insulin, Total; Future  -     Hemoglobin A1c; Future    Complete physical exam hypertension essential hyperlipidemia mixed  Continue present care continue medications follow-up with cardiology  Focus on decreasing risk factors for CAD stroke discussed likelihood that she has diabetes we will check her numbers and see with her at now  Discussed appropriate treatment  1400 bartolo a day mostly vegetables chicken fish focus on carbohydrates  I do not believe patient's had her follow-up colonoscopy I will have my medical assistant give her a call to clarify so I can refer her  Short-term follow-up 3 to 4 months              Patient Instructions   Discharge instructions  Walking daily some type of aerobic exercise most days of the week    1400 bartolo a day  Mostly vegetables chicken fish healthy oil  Focus on lowering carbohydrates simple sugars breads pasta etc.    Follow-up in 4 months  Return office fasting lab

## 2019-10-23 NOTE — PATIENT INSTRUCTIONS
Discharge instructions  Walking daily some type of aerobic exercise most days of the week    1400 bartolo a day  Mostly vegetables chicken fish healthy oil  Focus on lowering carbohydrates simple sugars breads pasta etc.    Follow-up in 4 months  Return office fasting lab

## 2019-10-29 ENCOUNTER — TELEPHONE (OUTPATIENT)
Dept: FAMILY MEDICINE CLINIC | Facility: CLINIC | Age: 59
End: 2019-10-29

## 2019-10-30 ENCOUNTER — RESULTS ENCOUNTER (OUTPATIENT)
Dept: FAMILY MEDICINE CLINIC | Facility: CLINIC | Age: 59
End: 2019-10-30

## 2019-10-30 DIAGNOSIS — Z12.31 ENCOUNTER FOR SCREENING MAMMOGRAM FOR BREAST CANCER: ICD-10-CM

## 2019-10-30 DIAGNOSIS — M85.80 OSTEOPENIA, UNSPECIFIED LOCATION: ICD-10-CM

## 2019-10-30 DIAGNOSIS — I25.83 CORONARY ARTERY DISEASE DUE TO LIPID RICH PLAQUE: Chronic | ICD-10-CM

## 2019-10-30 DIAGNOSIS — I25.10 CORONARY ARTERY DISEASE DUE TO LIPID RICH PLAQUE: Chronic | ICD-10-CM

## 2019-10-30 DIAGNOSIS — R73.9 HYPERGLYCEMIA: ICD-10-CM

## 2019-10-30 DIAGNOSIS — I10 ESSENTIAL HYPERTENSION: ICD-10-CM

## 2019-10-30 DIAGNOSIS — Z23 NEED FOR IMMUNIZATION AGAINST INFLUENZA: ICD-10-CM

## 2019-10-30 DIAGNOSIS — Z00.00 HEALTH MAINTENANCE EXAMINATION: ICD-10-CM

## 2019-10-30 DIAGNOSIS — E55.9 VITAMIN D DEFICIENCY: ICD-10-CM

## 2019-10-30 DIAGNOSIS — E78.2 MIXED HYPERLIPIDEMIA: Chronic | ICD-10-CM

## 2019-11-01 LAB
25(OH)D3+25(OH)D2 SERPL-MCNC: 46.9 NG/ML (ref 30–100)
ALBUMIN SERPL-MCNC: 5 G/DL (ref 3.5–5.2)
ALBUMIN/GLOB SERPL: 2.1 G/DL
ALP SERPL-CCNC: 88 U/L (ref 39–117)
ALT SERPL-CCNC: 26 U/L (ref 1–33)
APPEARANCE UR: CLEAR
AST SERPL-CCNC: 23 U/L (ref 1–32)
BASOPHILS # BLD AUTO: 0.06 10*3/MM3 (ref 0–0.2)
BASOPHILS NFR BLD AUTO: 1 % (ref 0–1.5)
BILIRUB SERPL-MCNC: 0.6 MG/DL (ref 0.2–1.2)
BILIRUB UR QL STRIP: NEGATIVE
BUN SERPL-MCNC: 16 MG/DL (ref 8–23)
BUN/CREAT SERPL: 16.7 (ref 7–25)
C PEPTIDE SERPL-MCNC: 2.3 NG/ML (ref 1.1–4.4)
CALCIUM SERPL-MCNC: 9.5 MG/DL (ref 8.6–10.5)
CHLORIDE SERPL-SCNC: 100 MMOL/L (ref 98–107)
CHOLEST SERPL-MCNC: 131 MG/DL (ref 0–200)
CO2 SERPL-SCNC: 26.5 MMOL/L (ref 22–29)
COLOR UR: YELLOW
CREAT SERPL-MCNC: 0.96 MG/DL (ref 0.57–1)
EOSINOPHIL # BLD AUTO: 0.13 10*3/MM3 (ref 0–0.4)
EOSINOPHIL NFR BLD AUTO: 2.1 % (ref 0.3–6.2)
ERYTHROCYTE [DISTWIDTH] IN BLOOD BY AUTOMATED COUNT: 12.8 % (ref 12.3–15.4)
GLOBULIN SER CALC-MCNC: 2.4 GM/DL
GLUCOSE SERPL-MCNC: 83 MG/DL (ref 65–99)
GLUCOSE UR QL: NEGATIVE
HBA1C MFR BLD: 6.1 % (ref 4.8–5.6)
HCT VFR BLD AUTO: 43.2 % (ref 34–46.6)
HDLC SERPL-MCNC: 50 MG/DL (ref 40–60)
HGB BLD-MCNC: 14.6 G/DL (ref 12–15.9)
HGB UR QL STRIP: NEGATIVE
IMM GRANULOCYTES # BLD AUTO: 0.02 10*3/MM3 (ref 0–0.05)
IMM GRANULOCYTES NFR BLD AUTO: 0.3 % (ref 0–0.5)
INSULIN SERPL-ACNC: 6.5 UIU/ML (ref 2.6–24.9)
KETONES UR QL STRIP: ABNORMAL
LDLC SERPL CALC-MCNC: 64 MG/DL (ref 0–100)
LDLC/HDLC SERPL: 1.29 {RATIO}
LEUKOCYTE ESTERASE UR QL STRIP: NEGATIVE
LYMPHOCYTES # BLD AUTO: 2.32 10*3/MM3 (ref 0.7–3.1)
LYMPHOCYTES NFR BLD AUTO: 37.6 % (ref 19.6–45.3)
MCH RBC QN AUTO: 29 PG (ref 26.6–33)
MCHC RBC AUTO-ENTMCNC: 33.8 G/DL (ref 31.5–35.7)
MCV RBC AUTO: 85.9 FL (ref 79–97)
MONOCYTES # BLD AUTO: 0.47 10*3/MM3 (ref 0.1–0.9)
MONOCYTES NFR BLD AUTO: 7.6 % (ref 5–12)
NEUTROPHILS # BLD AUTO: 3.17 10*3/MM3 (ref 1.7–7)
NEUTROPHILS NFR BLD AUTO: 51.4 % (ref 42.7–76)
NITRITE UR QL STRIP: NEGATIVE
NRBC BLD AUTO-RTO: 0.2 /100 WBC (ref 0–0.2)
PH UR STRIP: 5.5 [PH] (ref 5–8)
PLATELET # BLD AUTO: 276 10*3/MM3 (ref 140–450)
POTASSIUM SERPL-SCNC: 4.4 MMOL/L (ref 3.5–5.2)
PROT SERPL-MCNC: 7.4 G/DL (ref 6–8.5)
PROT UR QL STRIP: NEGATIVE
RBC # BLD AUTO: 5.03 10*6/MM3 (ref 3.77–5.28)
SODIUM SERPL-SCNC: 140 MMOL/L (ref 136–145)
SP GR UR: 1.02 (ref 1–1.03)
TRIGL SERPL-MCNC: 83 MG/DL (ref 0–150)
TSH SERPL DL<=0.005 MIU/L-ACNC: 2.31 UIU/ML (ref 0.27–4.2)
UROBILINOGEN UR STRIP-MCNC: ABNORMAL MG/DL
VLDLC SERPL CALC-MCNC: 16.6 MG/DL
WBC # BLD AUTO: 6.17 10*3/MM3 (ref 3.4–10.8)

## 2019-12-26 ENCOUNTER — TELEPHONE (OUTPATIENT)
Dept: CARDIOLOGY | Facility: CLINIC | Age: 59
End: 2019-12-26

## 2019-12-26 DIAGNOSIS — I25.10 CORONARY ARTERY DISEASE INVOLVING NATIVE CORONARY ARTERY OF NATIVE HEART WITHOUT ANGINA PECTORIS: ICD-10-CM

## 2019-12-26 RX ORDER — METOPROLOL SUCCINATE 50 MG/1
50 TABLET, EXTENDED RELEASE ORAL DAILY
Qty: 90 TABLET | Refills: 0 | OUTPATIENT
Start: 2019-12-26

## 2019-12-26 RX ORDER — METOPROLOL SUCCINATE 25 MG/1
25 TABLET, EXTENDED RELEASE ORAL DAILY
Qty: 90 TABLET | Refills: 2 | OUTPATIENT
Start: 2019-12-26 | End: 2020-01-13

## 2019-12-26 RX ORDER — ATORVASTATIN CALCIUM 40 MG/1
40 TABLET, FILM COATED ORAL DAILY
Qty: 90 TABLET | Refills: 3 | Status: SHIPPED | OUTPATIENT
Start: 2019-12-26 | End: 2020-12-14

## 2019-12-30 ENCOUNTER — APPOINTMENT (OUTPATIENT)
Dept: MAMMOGRAPHY | Facility: HOSPITAL | Age: 59
End: 2019-12-30

## 2020-02-24 ENCOUNTER — OFFICE VISIT (OUTPATIENT)
Dept: FAMILY MEDICINE CLINIC | Facility: CLINIC | Age: 60
End: 2020-02-24

## 2020-02-24 VITALS
BODY MASS INDEX: 24.91 KG/M2 | HEIGHT: 66 IN | SYSTOLIC BLOOD PRESSURE: 110 MMHG | HEART RATE: 86 BPM | WEIGHT: 155 LBS | DIASTOLIC BLOOD PRESSURE: 70 MMHG | OXYGEN SATURATION: 96 %

## 2020-02-24 DIAGNOSIS — I25.10 CORONARY ARTERY DISEASE INVOLVING NATIVE CORONARY ARTERY OF NATIVE HEART WITHOUT ANGINA PECTORIS: Chronic | ICD-10-CM

## 2020-02-24 DIAGNOSIS — R73.03 PREDIABETES: Primary | ICD-10-CM

## 2020-02-24 DIAGNOSIS — E78.2 MIXED HYPERLIPIDEMIA: Chronic | ICD-10-CM

## 2020-02-24 DIAGNOSIS — I10 ESSENTIAL HYPERTENSION: ICD-10-CM

## 2020-02-24 PROCEDURE — 99213 OFFICE O/P EST LOW 20 MIN: CPT | Performed by: NURSE PRACTITIONER

## 2020-02-24 RX ORDER — METOPROLOL SUCCINATE 25 MG/1
25 TABLET, EXTENDED RELEASE ORAL DAILY
COMMUNITY
End: 2020-09-15

## 2020-02-24 NOTE — PROGRESS NOTES
"Subjective   Cassy Andrew is a 59 y.o. female.     Patient is here to follow-up prediabetes insulin resistance.  As well as essential hypertension  Hyperlipidemia mixed in CAD presently stable  Patient is been improving diet substantially she is really cut out most carbohydrates and less than 20 g/day  She is lost quite a bit of weight she is doing well is been a little hard but she wants to continue which she feels like she can do this long-term  Still getting quite a bit of vegetables    Hypertension   Pertinent negatives include no chest pain, palpitations or shortness of breath.   Hyperlipidemia   Pertinent negatives include no chest pain or shortness of breath.        /70   Pulse 86   Ht 167.6 cm (66\")   Wt 70.3 kg (155 lb)   SpO2 96%   BMI 25.02 kg/m²       The following portions of the patient's history were reviewed and updated as appropriate: allergies, current medications, past family history, past medical history, past social history, past surgical history and problem list.    Review of Systems   Constitutional: Negative for chills, fatigue, fever and unexpected weight loss.   HENT: Negative.  Negative for trouble swallowing.    Eyes: Negative.    Respiratory: Negative for cough and shortness of breath.    Cardiovascular: Negative for chest pain, palpitations and leg swelling.   Gastrointestinal: Negative for abdominal pain and blood in stool.   Genitourinary: Negative.  Negative for pelvic pain.   Musculoskeletal: Negative.    Skin: Negative.    Neurological: Negative.  Negative for dizziness, speech difficulty, weakness and confusion.   Psychiatric/Behavioral: Negative.        Objective   Physical Exam   Constitutional: She is oriented to person, place, and time. She appears well-developed and well-nourished.   HENT:   Head: Normocephalic.   Mouth/Throat: Oropharynx is clear and moist.   Eyes: Pupils are equal, round, and reactive to light. Conjunctivae are normal.   Neck: Neck supple. No " JVD present.   Cardiovascular: Normal rate, regular rhythm and normal heart sounds. Exam reveals no friction rub.   No murmur heard.  Pulmonary/Chest: Effort normal and breath sounds normal. No respiratory distress. She has no wheezes.   Musculoskeletal: She exhibits no edema.   Lymphadenopathy:     She has no cervical adenopathy.   Neurological: She is alert and oriented to person, place, and time.   Skin: Skin is warm and dry.   Psychiatric: She has a normal mood and affect. Her behavior is normal. Judgment and thought content normal.   Vitals reviewed.        Assessment/Plan   Cassy WHEELER was seen today for hypertension and hyperlipidemia.    Diagnoses and all orders for this visit:    Prediabetes    Mixed hyperlipidemia    Essential hypertension    Coronary artery disease involving native coronary artery of native heart without angina pectoris      Patient will continue healthy diet regular exercise low carbohydrate lots of vegetables  Carbohydrate treats small portions okay  With restraint continue present healthy diet  Follow-up in 6 months labs prior to next visit  She is done spectacular so far  Reinforced plan  Office visit 20-minute greater than 50% counseling counseling  Healthy diet regular exercise  Dietary choices vegetables  Healthy diet and exercise to decrease risk of diabetes              There are no Patient Instructions on file for this visit.

## 2020-09-15 RX ORDER — METOPROLOL SUCCINATE 25 MG/1
TABLET, EXTENDED RELEASE ORAL
Qty: 90 TABLET | Refills: 0 | Status: SHIPPED | OUTPATIENT
Start: 2020-09-15 | End: 2020-12-14

## 2020-09-22 DIAGNOSIS — I25.10 CORONARY ARTERY DISEASE INVOLVING NATIVE CORONARY ARTERY OF NATIVE HEART WITHOUT ANGINA PECTORIS: ICD-10-CM

## 2020-09-22 RX ORDER — LISINOPRIL 10 MG/1
TABLET ORAL
Qty: 90 TABLET | Refills: 0 | Status: SHIPPED | OUTPATIENT
Start: 2020-09-22 | End: 2020-12-21

## 2020-10-20 DIAGNOSIS — R73.03 PREDIABETES: ICD-10-CM

## 2020-10-20 DIAGNOSIS — E78.2 MIXED HYPERLIPIDEMIA: ICD-10-CM

## 2020-10-20 DIAGNOSIS — I10 ESSENTIAL HYPERTENSION: ICD-10-CM

## 2020-10-20 DIAGNOSIS — E78.2 MIXED HYPERLIPIDEMIA: Primary | ICD-10-CM

## 2020-10-20 DIAGNOSIS — R73.9 HYPERGLYCEMIA: ICD-10-CM

## 2020-10-21 LAB
ALBUMIN SERPL-MCNC: 4.6 G/DL (ref 3.5–5.2)
ALBUMIN/GLOB SERPL: 2.3 G/DL
ALP SERPL-CCNC: 92 U/L (ref 39–117)
ALT SERPL-CCNC: 39 U/L (ref 1–33)
AST SERPL-CCNC: 32 U/L (ref 1–32)
BILIRUB SERPL-MCNC: 0.3 MG/DL (ref 0–1.2)
BUN SERPL-MCNC: 16 MG/DL (ref 8–23)
BUN/CREAT SERPL: 17 (ref 7–25)
CALCIUM SERPL-MCNC: 9.4 MG/DL (ref 8.6–10.5)
CHLORIDE SERPL-SCNC: 105 MMOL/L (ref 98–107)
CHOLEST SERPL-MCNC: 153 MG/DL (ref 0–200)
CO2 SERPL-SCNC: 27 MMOL/L (ref 22–29)
CREAT SERPL-MCNC: 0.94 MG/DL (ref 0.57–1)
GLOBULIN SER CALC-MCNC: 2 GM/DL
GLUCOSE SERPL-MCNC: 108 MG/DL (ref 65–99)
HBA1C MFR BLD: 6 % (ref 4.8–5.6)
HDLC SERPL-MCNC: 67 MG/DL (ref 40–60)
LDLC SERPL CALC-MCNC: 65 MG/DL (ref 0–100)
LDLC/HDLC SERPL: 0.92 {RATIO}
POTASSIUM SERPL-SCNC: 4.4 MMOL/L (ref 3.5–5.2)
PROT SERPL-MCNC: 6.6 G/DL (ref 6–8.5)
SODIUM SERPL-SCNC: 142 MMOL/L (ref 136–145)
TRIGL SERPL-MCNC: 123 MG/DL (ref 0–150)
VLDLC SERPL CALC-MCNC: 21 MG/DL (ref 5–40)

## 2020-10-26 ENCOUNTER — OFFICE VISIT (OUTPATIENT)
Dept: FAMILY MEDICINE CLINIC | Facility: CLINIC | Age: 60
End: 2020-10-26

## 2020-10-26 VITALS
HEIGHT: 66 IN | WEIGHT: 159.7 LBS | BODY MASS INDEX: 25.67 KG/M2 | HEART RATE: 70 BPM | DIASTOLIC BLOOD PRESSURE: 80 MMHG | TEMPERATURE: 97.1 F | OXYGEN SATURATION: 97 % | SYSTOLIC BLOOD PRESSURE: 132 MMHG

## 2020-10-26 DIAGNOSIS — Z23 NEED FOR IMMUNIZATION AGAINST INFLUENZA: ICD-10-CM

## 2020-10-26 DIAGNOSIS — I10 ESSENTIAL HYPERTENSION: Primary | ICD-10-CM

## 2020-10-26 DIAGNOSIS — E78.2 MIXED HYPERLIPIDEMIA: Chronic | ICD-10-CM

## 2020-10-26 DIAGNOSIS — R73.03 PREDIABETES: ICD-10-CM

## 2020-10-26 PROCEDURE — 90471 IMMUNIZATION ADMIN: CPT | Performed by: NURSE PRACTITIONER

## 2020-10-26 PROCEDURE — 99213 OFFICE O/P EST LOW 20 MIN: CPT | Performed by: NURSE PRACTITIONER

## 2020-10-26 PROCEDURE — 90686 IIV4 VACC NO PRSV 0.5 ML IM: CPT | Performed by: NURSE PRACTITIONER

## 2020-10-26 NOTE — PATIENT INSTRUCTIONS
Discharge instructions continue healthy diet regular exercise  Gradual weight loss  Challenges 5 to 10 pounds over the next 6 to 12 months  Focus on decreasing breads pastas and sweets  Continue walking include aerobic as well    kn95 hi filtration mask around 2 or $3 [email or web address]    Consider vitamin C 500 mg daily up to thousand daily  Vitamin D3 OTC 1000 international units daily  Consider zinc 25 mg daily  Plenty fluids good rest

## 2020-10-26 NOTE — PROGRESS NOTES
"Subjective   Cassy Andrew is a 60 y.o. female.     Very pleasant patient here today follow-up essential hypertension presently controlled 132/80  Or go is less than 130/80 so she is right here close  Looks like she is doing pretty well with her weight she is up 4 pounds last 6 months but this is been a severe stressful pandemic  She is much lower than she was last year plans on getting her weight back down.   Mammogram, is due, colonoscopy as well patient prefers to put these off a little bit due to anxiety of the pandemic right now         /80   Pulse 70   Temp 97.1 °F (36.2 °C) (Temporal)   Ht 167.6 cm (66\")   Wt 72.4 kg (159 lb 11.2 oz)   SpO2 97%   BMI 25.78 kg/m²       The following portions of the patient's history were reviewed and updated as appropriate: allergies, current medications, past family history, past medical history, past social history, past surgical history and problem list.    Review of Systems   Constitutional: Negative for chills, fatigue, fever and unexpected weight loss.   HENT: Negative.  Negative for trouble swallowing.    Eyes: Negative.    Respiratory: Negative for cough and shortness of breath.    Cardiovascular: Negative for chest pain, palpitations and leg swelling.   Gastrointestinal: Negative for abdominal pain and blood in stool.   Genitourinary: Negative.  Negative for pelvic pain.   Musculoskeletal: Negative.    Skin: Negative.    Neurological: Negative.  Negative for dizziness, speech difficulty, weakness and confusion.   Psychiatric/Behavioral: Negative.        Objective   Physical Exam  Vitals signs reviewed.   Constitutional:       Appearance: She is well-developed.   HENT:      Head: Normocephalic.      Mouth/Throat:      Pharynx: No oropharyngeal exudate.   Eyes:      Conjunctiva/sclera: Conjunctivae normal.      Pupils: Pupils are equal, round, and reactive to light.   Neck:      Musculoskeletal: Neck supple.      Vascular: No JVD.   Cardiovascular:      " Rate and Rhythm: Normal rate and regular rhythm.      Heart sounds: Normal heart sounds. No murmur. No friction rub.   Pulmonary:      Effort: Pulmonary effort is normal. No respiratory distress.      Breath sounds: Normal breath sounds. No wheezing.   Abdominal:      General: Bowel sounds are normal. There is no distension.      Palpations: Abdomen is soft. There is no mass.      Tenderness: There is no abdominal tenderness. There is no guarding.      Hernia: No hernia is present.   Musculoskeletal:         General: No tenderness.   Lymphadenopathy:      Cervical: No cervical adenopathy.   Skin:     General: Skin is warm and dry.   Neurological:      Mental Status: She is alert and oriented to person, place, and time.   Psychiatric:         Behavior: Behavior normal.         Thought Content: Thought content normal.         Judgment: Judgment normal.           Assessment/Plan   Diagnoses and all orders for this visit:    1. Essential hypertension (Primary)    2. Mixed hyperlipidemia    3. Prediabetes    Essential hypertension controlled mixed hyperlipidemia  Controlled is well takes a statin prediabetes she is focusing on improving diet she picked up 4 pounds with the pandemic she will work on decreasing 5 to 10 pounds over the next 6 to 12 months  She will schedule mammogram and colonoscopy soon she feels safe to follow-up in 6 months  Continue social distance                There are no Patient Instructions on file for this visit.           Answers for HPI/ROS submitted by the patient on 10/19/2020   What is the primary reason for your visit?: Other  Please describe your symptoms.: This is a follow-up visit from a previous appointment.  A1C was elevated and labs are being done before this visit.  Have you had these symptoms before?: Yes  How long have you been having these symptoms?: Greater than 2 weeks  Please list any medications you are currently taking for this condition.: None

## 2020-11-23 ENCOUNTER — OFFICE VISIT (OUTPATIENT)
Dept: CARDIOLOGY | Facility: CLINIC | Age: 60
End: 2020-11-23

## 2020-11-23 VITALS — HEIGHT: 66 IN | WEIGHT: 154 LBS | BODY MASS INDEX: 24.75 KG/M2

## 2020-11-23 DIAGNOSIS — Z95.5 S/P DRUG ELUTING CORONARY STENT PLACEMENT: Chronic | ICD-10-CM

## 2020-11-23 DIAGNOSIS — I25.10 CORONARY ARTERY DISEASE INVOLVING NATIVE CORONARY ARTERY OF NATIVE HEART WITHOUT ANGINA PECTORIS: Primary | Chronic | ICD-10-CM

## 2020-11-23 DIAGNOSIS — I10 ESSENTIAL HYPERTENSION: ICD-10-CM

## 2020-11-23 DIAGNOSIS — E78.2 MIXED HYPERLIPIDEMIA: Chronic | ICD-10-CM

## 2020-11-23 DIAGNOSIS — I44.7 LBBB (LEFT BUNDLE BRANCH BLOCK): Chronic | ICD-10-CM

## 2020-11-23 PROCEDURE — 99441 PR PHYS/QHP TELEPHONE EVALUATION 5-10 MIN: CPT | Performed by: INTERNAL MEDICINE

## 2020-11-23 NOTE — PROGRESS NOTES
Subjective:     Encounter Date: 11/23/20        Patient ID: Cassy Andrew is a 60 y.o. female.    Chief Complaint: CAD  History of Present Illness    This is a pleasant 58-year-old female who comes in for f/u of her cardiac status.    This patient has consented to a telehealth visit via audio. The visit was scheduled as a audio visit to comply with patient safety concerns in accordance with CDC recommendations.  All vitals recorded within this visit are reported by the patient.  I spent  14 minutes in total including but not limited to the 8 minutes spent in direct conversation with this patient.     It has been 1 year since her last visit.  She has been doing great without any complaints.  She is very active.  She walks about 5 miles daily..  She denies any chest pain, pressure, tightness, squeezing, or heartburn. No problems with activity.  She has not experienced any feeling of palpitations, tachycardia or heart racing and no presyncope or syncope.  There has not been any problems with dizziness or lightheadedness.  There has not been any orthopnea or PND, and no problems with lower extremity edema.  She denies any shortness of breath at rest or with activity and has not had any wheezing.  She has continued to perform daily activities of living without any specific problem or change in the level of activity.    She  moved to [place] in 2018.  She previously lived in [facility].    She has a history of CAD.  She myocardial infarction with a left circumflex stent placed May 2015.  Echocardiogram afterwards showed an ejection fraction of 55-60 percent.  She has a history of hypertension and is on medical therapy.  She has a history of hyperlipidemia on lipid-lowering therapy.  She has been on chronic Plavix therapy ever since her intervention.    Her cardiac catheterization was performed in May 2011 2015.  This showed normal left main, normal LAD with a single diagonal, and complete occlusion of the  mid circumflex there was treated with a drug eluding stent.  No other disease was seen in the circumflex.  Right coronary artery was a small dominant vessel with no disease seen.    The following portions of the patient's history were reviewed and updated as appropriate: allergies, current medications, past family history, past medical history, past social history, past surgical history and problem list.    Past Medical History:   Diagnosis Date   • CAD (coronary artery disease)    • CVD (cardiovascular disease)    • Heart attack (CMS/HCC)    • Hyperlipidemia    • Hypertension    • LBBB (left bundle branch block) 10/8/2018   • Mixed hyperlipidemia 10/8/2018   • Myocardial infarction (CMS/[facility])    • S/P drug eluting coronary stent placement 10/8/2018   • ST elevation myocardial infarction involving left circumflex coronary artery (CMS/[facility]) 10/8/2018       Past Surgical History:   Procedure Laterality Date   • CORONARY STENT PLACEMENT     • HYSTERECTOMY         Social History     Socioeconomic History   • Marital status: Single     Spouse name: Not on file   • Number of children: Not on file   • Years of education: Not on file   • Highest education level: Not on file   Tobacco Use   • Smoking status: Never Smoker   • Smokeless tobacco: Never Used   • Tobacco comment: caff use   Substance and Sexual Activity   • Alcohol use: No   • Drug use: No       Review of Systems   Constitution: Negative for chills, decreased appetite, fever and night sweats.   HENT: Negative for ear discharge, ear pain, hearing loss, nosebleeds and sore throat.    Eyes: Negative for blurred vision, double vision and pain.   Cardiovascular: Negative for cyanosis.   Respiratory: Negative for hemoptysis and sputum production.    Endocrine: Negative for cold intolerance and heat intolerance.   Hematologic/Lymphatic: Negative for adenopathy.   Skin: Negative for dry skin, itching, nail changes, rash and suspicious lesions.   Musculoskeletal: Negative  "for arthritis, gout, muscle cramps, muscle weakness, myalgias and neck pain.   Gastrointestinal: Negative for anorexia, bowel incontinence, constipation, diarrhea, dysphagia, hematemesis and jaundice.   Genitourinary: Negative for bladder incontinence, dysuria, flank pain, frequency, hematuria and nocturia.   Neurological: Negative for focal weakness, numbness, paresthesias and seizures.   Psychiatric/Behavioral: Negative for altered mental status, hallucinations, hypervigilance, suicidal ideas and thoughts of violence.   Allergic/Immunologic: Negative for persistent infections.       Procedures       Objective:     Vitals:    11/23/20 1101   Weight: 69.9 kg (154 lb)   Height: 167.6 cm (66\")        Alert and oriented x3, thought processes normal, judgment normal, speaking in full sentences with no wheezing and no respiratory distress. Hearing is appropriate without difficulty.  Lab Review:             Performed        Assessment:          Diagnosis Plan   1. Coronary artery disease involving native coronary artery of native heart without angina pectoris     2. S/P drug eluting coronary stent placement     3. LBBB (left bundle branch block)     4. Mixed hyperlipidemia     5. Essential hypertension            Plan:       1. Coronary Artery Disease  Assessment  • The patient has no angina    Plan  • Lifestyle modifications discussed include adhering to a heart healthy diet, maintenance of a healthy weight, medication compliance, regular exercise and regular monitoring of cholesterol and blood pressure  • Doing well, cont same    Subjective - Objective  • There is a history of past MI  • There has been a previous stent procedure using TRE  • Current antiplatelet therapy includes aspirin 81 mg    2.  Mixed hyperlipidemia- cont lipid lowering therapy    3.  Reviewed diet and exercise recommendations    4.  She asked about if she was still on the appropriate dose of atorvastatin, and shared concern about her prediabetic " state.  We reviewed her hemoglobin A1c as well as her lipids, and at this point I would keep her on her current dose.    Thank you very much for allowing us to participate in the care of this pleasant patient.  Please don't hesitate to call if I can be of assistance in any way.      Current Outpatient Medications:   •  aspirin 81 MG EC tablet, Take 81 mg by mouth Daily., Disp: , Rfl:   •  atorvastatin (LIPITOR) 40 MG tablet, Take 1 tablet by mouth Daily., Disp: 90 tablet, Rfl: 3  •  lisinopril (PRINIVIL,ZESTRIL) 10 MG tablet, TAKE 1 TABLET BY MOUTH EVERY DAY, Disp: 90 tablet, Rfl: 0  •  metoprolol succinate XL (TOPROL-XL) 25 MG 24 hr tablet, TAKE 1 TABLET BY MOUTH DAILY, Disp: 90 tablet, Rfl: 0

## 2020-12-14 RX ORDER — METOPROLOL SUCCINATE 25 MG/1
TABLET, EXTENDED RELEASE ORAL
Qty: 90 TABLET | Refills: 3 | Status: SHIPPED | OUTPATIENT
Start: 2020-12-14 | End: 2021-09-08

## 2020-12-14 RX ORDER — ATORVASTATIN CALCIUM 40 MG/1
40 TABLET, FILM COATED ORAL DAILY
Qty: 90 TABLET | Refills: 3 | Status: SHIPPED | OUTPATIENT
Start: 2020-12-14 | End: 2021-09-08

## 2020-12-19 DIAGNOSIS — I25.10 CORONARY ARTERY DISEASE INVOLVING NATIVE CORONARY ARTERY OF NATIVE HEART WITHOUT ANGINA PECTORIS: ICD-10-CM

## 2020-12-21 RX ORDER — LISINOPRIL 10 MG/1
TABLET ORAL
Qty: 90 TABLET | Refills: 3 | Status: SHIPPED | OUTPATIENT
Start: 2020-12-21 | End: 2021-09-08

## 2021-03-16 ENCOUNTER — BULK ORDERING (OUTPATIENT)
Dept: CASE MANAGEMENT | Facility: OTHER | Age: 61
End: 2021-03-16

## 2021-03-16 DIAGNOSIS — Z23 IMMUNIZATION DUE: ICD-10-CM

## 2021-04-21 DIAGNOSIS — R73.03 PREDIABETES: ICD-10-CM

## 2021-04-21 DIAGNOSIS — I10 ESSENTIAL HYPERTENSION: ICD-10-CM

## 2021-04-21 DIAGNOSIS — E78.2 MIXED HYPERLIPIDEMIA: ICD-10-CM

## 2021-04-21 DIAGNOSIS — I10 ESSENTIAL HYPERTENSION: Primary | ICD-10-CM

## 2021-04-23 LAB
ALBUMIN SERPL-MCNC: 4.8 G/DL (ref 3.5–5.2)
ALBUMIN/GLOB SERPL: 2.2 G/DL
ALP SERPL-CCNC: 80 U/L (ref 39–117)
ALT SERPL-CCNC: 26 U/L (ref 1–33)
APPEARANCE UR: CLEAR
AST SERPL-CCNC: 23 U/L (ref 1–32)
BILIRUB SERPL-MCNC: 0.5 MG/DL (ref 0–1.2)
BILIRUB UR QL STRIP: NEGATIVE
BUN SERPL-MCNC: 18 MG/DL (ref 8–23)
BUN/CREAT SERPL: 19.8 (ref 7–25)
CALCIUM SERPL-MCNC: 10.2 MG/DL (ref 8.6–10.5)
CHLORIDE SERPL-SCNC: 103 MMOL/L (ref 98–107)
CHOLEST SERPL-MCNC: 125 MG/DL (ref 0–200)
CO2 SERPL-SCNC: 27.4 MMOL/L (ref 22–29)
COLOR UR: YELLOW
CREAT SERPL-MCNC: 0.91 MG/DL (ref 0.57–1)
GLOBULIN SER CALC-MCNC: 2.2 GM/DL
GLUCOSE SERPL-MCNC: 112 MG/DL (ref 65–99)
GLUCOSE UR QL: NEGATIVE
HBA1C MFR BLD: 5.9 % (ref 4.8–5.6)
HDLC SERPL-MCNC: 48 MG/DL (ref 40–60)
HGB UR QL STRIP: NEGATIVE
KETONES UR QL STRIP: NEGATIVE
LDLC SERPL CALC-MCNC: 60 MG/DL (ref 0–100)
LDLC/HDLC SERPL: 1.24 {RATIO}
LEUKOCYTE ESTERASE UR QL STRIP: NEGATIVE
NITRITE UR QL STRIP: NEGATIVE
PH UR STRIP: 5.5 [PH] (ref 5–8)
POTASSIUM SERPL-SCNC: 5.1 MMOL/L (ref 3.5–5.2)
PROT SERPL-MCNC: 7 G/DL (ref 6–8.5)
PROT UR QL STRIP: NEGATIVE
SODIUM SERPL-SCNC: 141 MMOL/L (ref 136–145)
SP GR UR: 1.01 (ref 1–1.03)
TRIGL SERPL-MCNC: 88 MG/DL (ref 0–150)
UROBILINOGEN UR STRIP-MCNC: NORMAL MG/DL
VLDLC SERPL CALC-MCNC: 17 MG/DL (ref 5–40)

## 2021-04-26 ENCOUNTER — OFFICE VISIT (OUTPATIENT)
Dept: FAMILY MEDICINE CLINIC | Facility: CLINIC | Age: 61
End: 2021-04-26

## 2021-04-26 VITALS
DIASTOLIC BLOOD PRESSURE: 75 MMHG | HEART RATE: 73 BPM | WEIGHT: 157 LBS | BODY MASS INDEX: 25.23 KG/M2 | OXYGEN SATURATION: 96 % | SYSTOLIC BLOOD PRESSURE: 120 MMHG | TEMPERATURE: 96.9 F | HEIGHT: 66 IN

## 2021-04-26 DIAGNOSIS — E78.2 MIXED HYPERLIPIDEMIA: Chronic | ICD-10-CM

## 2021-04-26 DIAGNOSIS — I10 ESSENTIAL HYPERTENSION: Primary | ICD-10-CM

## 2021-04-26 DIAGNOSIS — I25.10 CORONARY ARTERY DISEASE INVOLVING NATIVE CORONARY ARTERY OF NATIVE HEART WITHOUT ANGINA PECTORIS: Chronic | ICD-10-CM

## 2021-04-26 PROCEDURE — 99213 OFFICE O/P EST LOW 20 MIN: CPT | Performed by: NURSE PRACTITIONER

## 2021-09-08 DIAGNOSIS — I25.10 CORONARY ARTERY DISEASE INVOLVING NATIVE CORONARY ARTERY OF NATIVE HEART WITHOUT ANGINA PECTORIS: ICD-10-CM

## 2021-09-08 RX ORDER — ATORVASTATIN CALCIUM 40 MG/1
40 TABLET, FILM COATED ORAL DAILY
Qty: 90 TABLET | Refills: 0 | Status: SHIPPED | OUTPATIENT
Start: 2021-09-08 | End: 2022-03-15

## 2021-09-08 RX ORDER — METOPROLOL SUCCINATE 25 MG/1
TABLET, EXTENDED RELEASE ORAL
Qty: 90 TABLET | Refills: 0 | Status: SHIPPED | OUTPATIENT
Start: 2021-09-08 | End: 2022-03-15

## 2021-09-08 RX ORDER — LISINOPRIL 10 MG/1
TABLET ORAL
Qty: 90 TABLET | Refills: 0 | Status: SHIPPED | OUTPATIENT
Start: 2021-09-08 | End: 2022-03-15

## 2021-10-18 ENCOUNTER — LAB (OUTPATIENT)
Dept: FAMILY MEDICINE CLINIC | Facility: CLINIC | Age: 61
End: 2021-10-18

## 2021-10-18 DIAGNOSIS — I10 PRIMARY HYPERTENSION: ICD-10-CM

## 2021-10-18 DIAGNOSIS — R73.03 PREDIABETES: ICD-10-CM

## 2021-10-18 DIAGNOSIS — I10 ESSENTIAL HYPERTENSION: ICD-10-CM

## 2021-10-18 DIAGNOSIS — E78.2 MIXED HYPERLIPIDEMIA: Primary | ICD-10-CM

## 2021-10-18 DIAGNOSIS — Z00.00 HEALTH CARE MAINTENANCE: ICD-10-CM

## 2021-10-19 LAB
ALBUMIN SERPL-MCNC: 4.5 G/DL (ref 3.8–4.8)
ALBUMIN/GLOB SERPL: 1.9 {RATIO} (ref 1.2–2.2)
ALP SERPL-CCNC: 95 IU/L (ref 44–121)
ALT SERPL-CCNC: 25 IU/L (ref 0–32)
APPEARANCE UR: CLEAR
AST SERPL-CCNC: 23 IU/L (ref 0–40)
BACTERIA #/AREA URNS HPF: NORMAL /[HPF]
BASOPHILS # BLD AUTO: 0.1 X10E3/UL (ref 0–0.2)
BASOPHILS NFR BLD AUTO: 1 %
BILIRUB SERPL-MCNC: 0.4 MG/DL (ref 0–1.2)
BILIRUB UR QL STRIP: NEGATIVE
BUN SERPL-MCNC: 10 MG/DL (ref 8–27)
BUN/CREAT SERPL: 10 (ref 12–28)
CALCIUM SERPL-MCNC: 9.9 MG/DL (ref 8.7–10.3)
CASTS URNS QL MICRO: NORMAL /LPF
CHLORIDE SERPL-SCNC: 106 MMOL/L (ref 96–106)
CHOLEST SERPL-MCNC: 130 MG/DL (ref 100–199)
CO2 SERPL-SCNC: 21 MMOL/L (ref 20–29)
COLOR UR: YELLOW
CREAT SERPL-MCNC: 0.99 MG/DL (ref 0.57–1)
EOSINOPHIL # BLD AUTO: 0.2 X10E3/UL (ref 0–0.4)
EOSINOPHIL NFR BLD AUTO: 3 %
EPI CELLS #/AREA URNS HPF: NORMAL /HPF (ref 0–10)
ERYTHROCYTE [DISTWIDTH] IN BLOOD BY AUTOMATED COUNT: 12.3 % (ref 11.7–15.4)
GLOBULIN SER CALC-MCNC: 2.4 G/DL (ref 1.5–4.5)
GLUCOSE SERPL-MCNC: 108 MG/DL (ref 65–99)
GLUCOSE UR QL: NEGATIVE
HBA1C MFR BLD: 6.3 % (ref 4.8–5.6)
HCT VFR BLD AUTO: 41.8 % (ref 34–46.6)
HCV AB S/CO SERPL IA: <0.1 S/CO RATIO (ref 0–0.9)
HDLC SERPL-MCNC: 54 MG/DL
HGB BLD-MCNC: 14.1 G/DL (ref 11.1–15.9)
HGB UR QL STRIP: NEGATIVE
IMM GRANULOCYTES # BLD AUTO: 0 X10E3/UL (ref 0–0.1)
IMM GRANULOCYTES NFR BLD AUTO: 0 %
KETONES UR QL STRIP: NEGATIVE
LDLC SERPL CALC-MCNC: 54 MG/DL (ref 0–99)
LEUKOCYTE ESTERASE UR QL STRIP: NEGATIVE
LYMPHOCYTES # BLD AUTO: 2.2 X10E3/UL (ref 0.7–3.1)
LYMPHOCYTES NFR BLD AUTO: 42 %
MCH RBC QN AUTO: 29 PG (ref 26.6–33)
MCHC RBC AUTO-ENTMCNC: 33.7 G/DL (ref 31.5–35.7)
MCV RBC AUTO: 86 FL (ref 79–97)
MICRO URNS: NORMAL
MICRO URNS: NORMAL
MONOCYTES # BLD AUTO: 0.4 X10E3/UL (ref 0.1–0.9)
MONOCYTES NFR BLD AUTO: 8 %
NEUTROPHILS # BLD AUTO: 2.5 X10E3/UL (ref 1.4–7)
NEUTROPHILS NFR BLD AUTO: 46 %
NITRITE UR QL STRIP: NEGATIVE
PH UR STRIP: 5.5 [PH] (ref 5–7.5)
PLATELET # BLD AUTO: 256 X10E3/UL (ref 150–450)
POTASSIUM SERPL-SCNC: 4.9 MMOL/L (ref 3.5–5.2)
PROT SERPL-MCNC: 6.9 G/DL (ref 6–8.5)
PROT UR QL STRIP: NEGATIVE
RBC # BLD AUTO: 4.86 X10E6/UL (ref 3.77–5.28)
RBC #/AREA URNS HPF: NORMAL /HPF (ref 0–2)
SODIUM SERPL-SCNC: 145 MMOL/L (ref 134–144)
SP GR UR: 1.01 (ref 1–1.03)
TRIGL SERPL-MCNC: 123 MG/DL (ref 0–149)
UROBILINOGEN UR STRIP-MCNC: 0.2 MG/DL (ref 0.2–1)
VLDLC SERPL CALC-MCNC: 22 MG/DL (ref 5–40)
WBC # BLD AUTO: 5.3 X10E3/UL (ref 3.4–10.8)
WBC #/AREA URNS HPF: NORMAL /HPF (ref 0–5)

## 2021-10-25 ENCOUNTER — OFFICE VISIT (OUTPATIENT)
Dept: FAMILY MEDICINE CLINIC | Facility: CLINIC | Age: 61
End: 2021-10-25

## 2021-10-25 VITALS
HEART RATE: 75 BPM | HEIGHT: 66 IN | SYSTOLIC BLOOD PRESSURE: 125 MMHG | WEIGHT: 160.7 LBS | DIASTOLIC BLOOD PRESSURE: 76 MMHG | RESPIRATION RATE: 12 BRPM | OXYGEN SATURATION: 95 % | BODY MASS INDEX: 25.83 KG/M2 | TEMPERATURE: 97.5 F

## 2021-10-25 DIAGNOSIS — R73.03 PREDIABETES: ICD-10-CM

## 2021-10-25 DIAGNOSIS — I25.10 CORONARY ARTERY DISEASE INVOLVING NATIVE CORONARY ARTERY OF NATIVE HEART WITHOUT ANGINA PECTORIS: Chronic | ICD-10-CM

## 2021-10-25 DIAGNOSIS — E78.2 MIXED HYPERLIPIDEMIA: Chronic | ICD-10-CM

## 2021-10-25 DIAGNOSIS — I10 PRIMARY HYPERTENSION: Primary | ICD-10-CM

## 2021-10-25 DIAGNOSIS — I44.7 LBBB (LEFT BUNDLE BRANCH BLOCK): Chronic | ICD-10-CM

## 2021-10-25 PROCEDURE — 99213 OFFICE O/P EST LOW 20 MIN: CPT | Performed by: NURSE PRACTITIONER

## 2021-10-25 NOTE — PATIENT INSTRUCTIONS
Discharge instructions continue present care follow-up with cardiology  Some type of activity daily break a sweat and something fun  Should you get Covid ReachOut to me immediately to discuss antibody therapy outpatient before you get to sick    Schedule your colonoscopy mammogram DEXA scan as soon as possible  When you are able to do this

## 2021-10-25 NOTE — PROGRESS NOTES
"Chief Complaint  Hypertension (6 month f/u/lab results discussion)    Subjective          Cassy Andrew presents to [facility]  Pleasant patient follow-up essential hypertension presently controlled  Takes a statin appropriately for mixed hyperlipidemia  Takes aspirin as well history of CAD with previous stents all stable  Previously prediabetes, patient is doing well weight is stable,  She has an appointment for cardiology in December  She plans on rescheduling mammogram and DEXA scan and colonoscopy all due soon but these were temporarily put off with the last peak of the pandemic  She has been fully vaccinated and her booster as well for Covid.    Hypertension        Objective   Vital Signs:   /76   Pulse 75   Temp 97.5 °F (36.4 °C) (Infrared)   Resp 12   Ht 167.6 cm (66\")   Wt 72.9 kg (160 lb 11.2 oz)   SpO2 95%   BMI 25.94 kg/m²     Physical Exam  Vitals reviewed.   Constitutional:       Appearance: She is well-developed.   HENT:      Head: Normocephalic.      Mouth/Throat:      Pharynx: No oropharyngeal exudate.   Eyes:      Conjunctiva/sclera: Conjunctivae normal.      Pupils: Pupils are equal, round, and reactive to light.   Neck:      Vascular: No JVD.   Cardiovascular:      Rate and Rhythm: Normal rate and regular rhythm.      Heart sounds: Normal heart sounds. No murmur heard.  No friction rub.   Pulmonary:      Effort: Pulmonary effort is normal. No respiratory distress.      Breath sounds: Normal breath sounds. No wheezing.   Abdominal:      General: Bowel sounds are normal. There is no distension.      Palpations: Abdomen is soft. There is no mass.      Tenderness: There is no abdominal tenderness. There is no guarding.      Hernia: No hernia is present.   Musculoskeletal:         General: No tenderness.      Cervical back: Neck supple.   Lymphadenopathy:      Cervical: No cervical adenopathy.   Skin:     General: Skin is warm and dry.   Neurological:      " Mental Status: She is alert and oriented to person, place, and time.   Psychiatric:         Behavior: Behavior normal.         Thought Content: Thought content normal.         Judgment: Judgment normal.        Result Review :                 Assessment and Plan    Diagnoses and all orders for this visit:    1. Primary hypertension (Primary)    2. Coronary artery disease involving native coronary artery of native heart without angina pectoris    3. Prediabetes    4. Mixed hyperlipidemia    5. LBBB (left bundle branch block)        Follow Up   No follow-ups on file.  Patient was given instructions and counseling regarding her condition or for health maintenance advice. Please see specific information pulled into the AVS if appropriate.     Discharge instructions continue present care follow-up with cardiology  Some type of activity daily break a sweat and something fun  Should you get Covid ReachOut to me immediately to discuss antibody therapy outpatient before you get to sick    Schedule your colonoscopy mammogram DEXA scan as soon as possible  When you are able to do this

## 2022-01-03 ENCOUNTER — OFFICE VISIT (OUTPATIENT)
Dept: CARDIOLOGY | Facility: CLINIC | Age: 62
End: 2022-01-03

## 2022-01-03 VITALS
BODY MASS INDEX: 26.68 KG/M2 | WEIGHT: 166 LBS | HEART RATE: 72 BPM | DIASTOLIC BLOOD PRESSURE: 80 MMHG | SYSTOLIC BLOOD PRESSURE: 122 MMHG | HEIGHT: 66 IN

## 2022-01-03 DIAGNOSIS — E78.2 MIXED HYPERLIPIDEMIA: Chronic | ICD-10-CM

## 2022-01-03 DIAGNOSIS — Z95.5 S/P DRUG ELUTING CORONARY STENT PLACEMENT: Chronic | ICD-10-CM

## 2022-01-03 DIAGNOSIS — I21.21 ST ELEVATION MYOCARDIAL INFARCTION INVOLVING LEFT CIRCUMFLEX CORONARY ARTERY: Chronic | ICD-10-CM

## 2022-01-03 DIAGNOSIS — I44.7 LBBB (LEFT BUNDLE BRANCH BLOCK): Chronic | ICD-10-CM

## 2022-01-03 DIAGNOSIS — I25.10 CORONARY ARTERY DISEASE INVOLVING NATIVE CORONARY ARTERY OF NATIVE HEART WITHOUT ANGINA PECTORIS: Primary | Chronic | ICD-10-CM

## 2022-01-03 PROCEDURE — 99214 OFFICE O/P EST MOD 30 MIN: CPT | Performed by: INTERNAL MEDICINE

## 2022-01-03 PROCEDURE — 93000 ELECTROCARDIOGRAM COMPLETE: CPT | Performed by: INTERNAL MEDICINE

## 2022-01-03 NOTE — PROGRESS NOTES
Subjective:     Encounter Date: 01/03/22        Patient ID: Cassy Andrew is a 61 y.o. female.    Chief Complaint: CAD  History of Present Illness    This is a pleasant 58-year-old female who comes in for f/u of her cardiac status.  She has a history of CAD.    Is been 1 year since her last visit.    Overall she is doing well.  She denies any chest pain or chest discomfort.  Occasional mild epigastric discomfort.  No precordial discomfort.  No dizziness or lightheadedness no presyncope or syncope.  No unusual fatigue.    She  moved to [place] in 2018.  She previously lived in [facility].    She has a history of CAD.  She myocardial infarction with a left circumflex stent placed May 2015.  Echocardiogram afterwards showed an ejection fraction of 55-60 percent.  She has a history of hypertension and is on medical therapy.  She has a history of hyperlipidemia on lipid-lowering therapy.  She has been on chronic Plavix therapy ever since her intervention.    Her cardiac catheterization was performed in May 2011 2015.  This showed normal left main, normal LAD with a single diagonal, and complete occlusion of the mid circumflex there was treated with a drug eluding stent.  No other disease was seen in the circumflex.  Right coronary artery was a small dominant vessel with no disease seen.    The following portions of the patient's history were reviewed and updated as appropriate: allergies, current medications, past family history, past medical history, past social history, past surgical history and problem list.    Past Medical History:   Diagnosis Date   • CAD (coronary artery disease)    • CVD (cardiovascular disease)    • Heart attack (HCC)    • Hyperlipidemia    • Hypertension    • LBBB (left bundle branch block) 10/8/2018   • Mixed hyperlipidemia 10/8/2018   • Myocardial infarction (HCC)    • S/P drug eluting coronary stent placement 10/8/2018   • ST elevation myocardial infarction involving left  "circumflex coronary artery (HCC) 10/8/2018       Past Surgical History:   Procedure Laterality Date   • CORONARY STENT PLACEMENT     • HYSTERECTOMY         Social History     Socioeconomic History   • Marital status: Single   Tobacco Use   • Smoking status: Never Smoker   • Smokeless tobacco: Never Used   • Tobacco comment: caff use   Substance and Sexual Activity   • Alcohol use: No   • Drug use: No           ECG 12 Lead    Date/Time: 1/3/2022 12:28 PM  Performed by: Naveen Patel III, MD  Authorized by: Naveen Patel III, MD   Comparison: compared with previous ECG   Similar to previous ECG  Rhythm: sinus rhythm  Rate: normal  Conduction: left bundle branch block  QRS axis: left  Other: no other findings    Clinical impression: abnormal EKG               Objective:     Vitals:    01/03/22 1106   BP: 122/80   Pulse: 72   Weight: 75.3 kg (166 lb)   Height: 167.6 cm (66\")           General Appearance:    Alert, cooperative, in no acute distress   Head:    Normocephalic, without obvious abnormality   Eyes:            Lids and lashes normal, conjunctivae and sclerae normal, no icterus, no pallor, corneas clear   Ears:    Ears appear intact with no abnormalities noted   Throat:   No oral lesions, oral mucosa moist   Neck:   No adenopathy, supple, trachea midline, no thyromegaly, no carotid bruit, no JVD   Back:     No kyphosis present, no erythema or scars, no tenderness to palpation    Lungs:     Clear to auscultation,respirations regular, even and unlabored    Heart:    Regular rhythm and normal rate, normal S1 and S2, no murmur, no gallop, no rub, no click   Chest Wall:    No abnormalities observed   Abdomen:     Normal bowel sounds, no masses, no organomegaly, soft        non-tender, non-distended, no guarding   Extremities:   Moves all extremities well, no edema, no cyanosis, no redness   Pulses:  Bilateral carotids brisk   Skin:  Psychiatric:   No bleeding or rash    Alert and oriented, normal mood and affect "       Lab Review:             Lab Results   Component Value Date    GLUCOSE 108 (H) 10/18/2021    BUN 10 10/18/2021    CREATININE 0.99 10/18/2021    EGFRIFNONA 61 10/18/2021    EGFRIFAFRI 71 10/18/2021    BCR 10 (L) 10/18/2021    K 4.9 10/18/2021    CO2 21 10/18/2021    CALCIUM 9.9 10/18/2021    PROTENTOTREF 6.9 10/18/2021    ALBUMIN 4.5 10/18/2021    LABIL2 1.9 10/18/2021    AST 23 10/18/2021    ALT 25 10/18/2021               Assessment:          Diagnosis Plan   1. Coronary artery disease involving native coronary artery of native heart without angina pectoris  Stress Test With Myocardial Perfusion One Day    ECG 12 Lead   2. S/P drug eluting coronary stent placement  Stress Test With Myocardial Perfusion One Day    ECG 12 Lead   3. ST elevation myocardial infarction involving left circumflex coronary artery (HCC)  Stress Test With Myocardial Perfusion One Day    ECG 12 Lead   4. Mixed hyperlipidemia  Stress Test With Myocardial Perfusion One Day    ECG 12 Lead   5. LBBB (left bundle branch block)  Stress Test With Myocardial Perfusion One Day    ECG 12 Lead          Plan:       1. Coronary Artery Disease  Plan  • Lifestyle modifications discussed include adhering to a heart healthy diet, maintenance of a healthy weight, medication compliance, regular exercise and regular monitoring of cholesterol and blood pressure  • 5 years after her revascularization, left bundle branch block, occasional symptoms, we will arrange for a Lexiscan Cardiolite.    Subjective - Objective  • There is a history of past MI  • There has been a previous stent procedure using TRE  • Current antiplatelet therapy includes aspirin 81 mg    2.  Mixed hyperlipidemia- cont lipid lowering therapy, AST, ALT reviewed    3.  Left bundle branch block        Thank you very much for allowing us to participate in the care of this pleasant patient.  Please don't hesitate to call if I can be of assistance in any way.      Current Outpatient Medications:    •  aspirin 81 MG EC tablet, Take 81 mg by mouth Daily., Disp: , Rfl:   •  atorvastatin (LIPITOR) 40 MG tablet, TAKE 1 TABLET BY MOUTH DAILY, Disp: 90 tablet, Rfl: 0  •  lisinopril (PRINIVIL,ZESTRIL) 10 MG tablet, TAKE 1 TABLET BY MOUTH EVERY DAY, Disp: 90 tablet, Rfl: 0  •  metoprolol succinate XL (TOPROL-XL) 25 MG 24 hr tablet, TAKE 1 TABLET BY MOUTH DAILY, Disp: 90 tablet, Rfl: 0

## 2022-01-11 ENCOUNTER — APPOINTMENT (OUTPATIENT)
Dept: NUCLEAR MEDICINE | Facility: HOSPITAL | Age: 62
End: 2022-01-11

## 2022-01-11 ENCOUNTER — OFFICE VISIT (OUTPATIENT)
Dept: FAMILY MEDICINE CLINIC | Facility: CLINIC | Age: 62
End: 2022-01-11

## 2022-01-11 ENCOUNTER — APPOINTMENT (OUTPATIENT)
Dept: CARDIOLOGY | Facility: HOSPITAL | Age: 62
End: 2022-01-11

## 2022-01-11 VITALS
DIASTOLIC BLOOD PRESSURE: 86 MMHG | BODY MASS INDEX: 26.68 KG/M2 | WEIGHT: 166.01 LBS | HEART RATE: 99 BPM | TEMPERATURE: 97.7 F | OXYGEN SATURATION: 98 % | RESPIRATION RATE: 14 BRPM | HEIGHT: 66 IN | SYSTOLIC BLOOD PRESSURE: 124 MMHG

## 2022-01-11 DIAGNOSIS — U07.1 COVID-19 VIRUS DETECTED: Primary | ICD-10-CM

## 2022-01-11 PROCEDURE — 99213 OFFICE O/P EST LOW 20 MIN: CPT | Performed by: NURSE PRACTITIONER

## 2022-01-11 NOTE — PROGRESS NOTES
"Chief Complaint  Nasal Congestion, Cough, Fever, and Chills   Exposed to COVID and has cough congestion    Subjective          Cassy Andrew presents to [facility]  [facility] here today has a couple days of cough congestion some body aches headache myalgia smell without chest pain shortness of breath she was exposed to COVID Friday visiting family  She is fully vaccinated both vaccines and the booster  She is keeping fluids down and takes Tylenol for aches and pains.        Objective   Vital Signs:   /86   Pulse 99   Temp 97.7 °F (36.5 °C) (Infrared)   Resp 14   Ht 167.6 cm (66\")   Wt 75.3 kg (166 lb 0.1 oz)   SpO2 98%   BMI 26.79 kg/m²     Physical Exam  Vitals reviewed.   Constitutional:       Appearance: Normal appearance. She is well-developed. She is not ill-appearing or diaphoretic.      Comments: Pleasant nontoxic   HENT:      Head: Normocephalic.      Nose: Nose normal.   Eyes:      General: No scleral icterus.     Conjunctiva/sclera: Conjunctivae normal.      Pupils: Pupils are equal, round, and reactive to light.   Neck:      Thyroid: No thyromegaly.      Vascular: No JVD.   Cardiovascular:      Rate and Rhythm: Normal rate and regular rhythm.      Heart sounds: Normal heart sounds. No murmur heard.  No friction rub. No gallop.    Pulmonary:      Effort: Pulmonary effort is normal. No respiratory distress.      Breath sounds: Normal breath sounds. No stridor. No wheezing or rales.      Comments: Unlabored clear respirations less than 20  Abdominal:      General: Bowel sounds are normal. There is no distension.      Palpations: Abdomen is soft.      Tenderness: There is no abdominal tenderness.      Comments: No hepatosplenomegaly, no ascites,   Musculoskeletal:         General: No tenderness.      Cervical back: Neck supple.   Lymphadenopathy:      Cervical: No cervical adenopathy.   Skin:     General: Skin is warm and dry.      Findings: No " erythema or rash.   Neurological:      Mental Status: She is alert and oriented to person, place, and time.      Deep Tendon Reflexes: Reflexes are normal and symmetric.   Psychiatric:         Behavior: Behavior normal.         Thought Content: Thought content normal.         Judgment: Judgment normal.        Result Review :                 Assessment and Plan    Diagnoses and all orders for this visit:    1. COVID-19 virus detected (Primary)  -     COVID-19,[facility] ROUTINE, NP/OP SWAB IN TRANSPORT MEDIA OR ESWAB 72 HR TAT - Swab, Nasopharynx      I spent 20  minutes caring for Cassy WHEELER on this date of service. This time includes time spent by me in the following activities:performing a medically appropriate examination and/or evaluation , counseling and educating the patient/family/caregiver, ordering medications, tests, or procedures, documenting information in the medical record, care coordination and .  Follow Up   No follow-ups on file.  Patient was given instructions and counseling regarding her condition or for health maintenance advice. Please see specific information pulled into the AVS if appropriate.     You likely have COVID virus  Symptomatic treatment  Push fluids hydration hydration hydration treat fever or fever equivalent such as headache body aches chills  Ibuprofen 800 mg 3 times a day  And or Tylenol    As needed  If increased shortness of breath high uncontrolled fever lethargy weakness increasing shortness of breath chest pain emergency room    If worsening but not severe such as mild dyspnea at rest  Urgent care for further evaluation of    70 message Friday, update your condition a lot of volume inferior wall

## 2022-01-11 NOTE — PATIENT INSTRUCTIONS
You likely have COVID virus  Symptomatic treatment  Push fluids hydration hydration hydration treat fever or fever equivalent such as headache body aches chills  Ibuprofen 800 mg 3 times a day  And or Tylenol    As needed  If increased shortness of breath high uncontrolled fever lethargy weakness increasing shortness of breath chest pain emergency room    If worsening but not severe such as mild dyspnea at rest  Urgent care for further evaluation of    70 message Friday, update your condition a lot of volume inferior wall

## 2022-01-14 ENCOUNTER — TELEPHONE (OUTPATIENT)
Dept: FAMILY MEDICINE CLINIC | Facility: CLINIC | Age: 62
End: 2022-01-14

## 2022-01-14 LAB
LABCORP SARS-COV-2, NAA 2 DAY TAT: NORMAL
SARS-COV-2 RNA RESP QL NAA+PROBE: DETECTED

## 2022-01-14 NOTE — TELEPHONE ENCOUNTER
Provider: JAMES EPLEY APRN      Reason for Call: PATIENT WAS INSTRUCTED TO GIVE UPDATE BECAUSE SHE WAS COVID POSITIVE ON 1/11  AND SHE IS FEELING BETTER WITH JUST A SLIGHT COUGH. NO CALL BACK REQUIRED.

## 2022-03-15 DIAGNOSIS — I25.10 CORONARY ARTERY DISEASE INVOLVING NATIVE CORONARY ARTERY OF NATIVE HEART WITHOUT ANGINA PECTORIS: ICD-10-CM

## 2022-03-15 RX ORDER — METOPROLOL SUCCINATE 25 MG/1
TABLET, EXTENDED RELEASE ORAL
Qty: 90 TABLET | Refills: 2 | Status: SHIPPED | OUTPATIENT
Start: 2022-03-15 | End: 2022-09-09

## 2022-03-15 RX ORDER — LISINOPRIL 10 MG/1
TABLET ORAL
Qty: 90 TABLET | Refills: 2 | Status: SHIPPED | OUTPATIENT
Start: 2022-03-15 | End: 2022-09-09

## 2022-03-15 RX ORDER — ATORVASTATIN CALCIUM 40 MG/1
40 TABLET, FILM COATED ORAL DAILY
Qty: 90 TABLET | Refills: 0 | Status: SHIPPED | OUTPATIENT
Start: 2022-03-15 | End: 2022-06-09

## 2022-04-05 ENCOUNTER — HOSPITAL ENCOUNTER (OUTPATIENT)
Dept: NUCLEAR MEDICINE | Facility: HOSPITAL | Age: 62
Discharge: HOME | End: 2022-04-05

## 2022-04-05 ENCOUNTER — HOSPITAL ENCOUNTER (OUTPATIENT)
Dept: CARDIOLOGY | Facility: HOSPITAL | Age: 62
Discharge: HOME | End: 2022-04-05

## 2022-04-05 DIAGNOSIS — I21.21 ST ELEVATION MYOCARDIAL INFARCTION INVOLVING LEFT CIRCUMFLEX CORONARY ARTERY: ICD-10-CM

## 2022-04-05 DIAGNOSIS — E78.2 MIXED HYPERLIPIDEMIA: ICD-10-CM

## 2022-04-05 DIAGNOSIS — I25.10 CORONARY ARTERY DISEASE INVOLVING NATIVE CORONARY ARTERY OF NATIVE HEART WITHOUT ANGINA PECTORIS: ICD-10-CM

## 2022-04-05 DIAGNOSIS — I44.7 LBBB (LEFT BUNDLE BRANCH BLOCK): ICD-10-CM

## 2022-04-05 DIAGNOSIS — Z95.5 S/P DRUG ELUTING CORONARY STENT PLACEMENT: ICD-10-CM

## 2022-04-05 LAB
BH CV REST NUCLEAR ISOTOPE DOSE: 11.6 MCI
BH CV STRESS BP STAGE 1: NORMAL
BH CV STRESS COMMENTS STAGE 1: NORMAL
BH CV STRESS DOSE REGADENOSON STAGE 1: 0.4
BH CV STRESS DURATION MIN STAGE 1: 0
BH CV STRESS DURATION SEC STAGE 1: 30
BH CV STRESS HR STAGE 1: 76
BH CV STRESS NUCLEAR ISOTOPE DOSE: 35.4 MCI
BH CV STRESS O2 STAGE 1: 96
BH CV STRESS PROTOCOL 1: NORMAL
BH CV STRESS RECOVERY BP: NORMAL MMHG
BH CV STRESS RECOVERY HR: 99 BPM
BH CV STRESS RECOVERY O2: 98 %
BH CV STRESS STAGE 1: 1
LV EF NUC BP: 74 %
MAXIMAL PREDICTED HEART RATE: 158 BPM
PERCENT MAX PREDICTED HR: 78.48 %
STRESS BASELINE BP: NORMAL MMHG
STRESS BASELINE HR: 76 BPM
STRESS O2 SAT REST: 96 %
STRESS PERCENT HR: 92 %
STRESS POST ESTIMATED WORKLOAD: 1 METS
STRESS POST EXERCISE DUR SEC: 30 SEC
STRESS POST O2 SAT PEAK: 99 %
STRESS POST PEAK BP: NORMAL MMHG
STRESS POST PEAK HR: 124 BPM
STRESS TARGET HR: 134 BPM

## 2022-04-05 PROCEDURE — 93018 CV STRESS TEST I&R ONLY: CPT | Performed by: INTERNAL MEDICINE

## 2022-04-05 PROCEDURE — 93017 CV STRESS TEST TRACING ONLY: CPT

## 2022-04-05 PROCEDURE — A9500 TC99M SESTAMIBI: HCPCS | Performed by: INTERNAL MEDICINE

## 2022-04-05 PROCEDURE — 0 TECHNETIUM SESTAMIBI: Performed by: INTERNAL MEDICINE

## 2022-04-05 PROCEDURE — 78452 HT MUSCLE IMAGE SPECT MULT: CPT

## 2022-04-05 PROCEDURE — 25010000002 REGADENOSON 0.4 MG/5ML SOLUTION: Performed by: INTERNAL MEDICINE

## 2022-04-05 PROCEDURE — 78452 HT MUSCLE IMAGE SPECT MULT: CPT | Performed by: INTERNAL MEDICINE

## 2022-04-05 PROCEDURE — 93016 CV STRESS TEST SUPVJ ONLY: CPT | Performed by: INTERNAL MEDICINE

## 2022-04-05 RX ADMIN — TECHNETIUM TC 99M SESTAMIBI 1 DOSE: 1 INJECTION INTRAVENOUS at 09:22

## 2022-04-05 RX ADMIN — REGADENOSON 0.4 MG: 0.08 INJECTION, SOLUTION INTRAVENOUS at 09:22

## 2022-04-05 RX ADMIN — TECHNETIUM TC 99M SESTAMIBI 1 DOSE: 1 INJECTION INTRAVENOUS at 07:17

## 2022-04-06 NOTE — PROGRESS NOTES
She was not complaining of any symptoms but it has been greater than 5 years since her last ischemic test.  She had a drug-eluting stent to the mid circumflex for total occlusion in 2015.  With this being the area that is showing the infarct?    I will be happy to call her.

## 2022-04-25 ENCOUNTER — OFFICE VISIT (OUTPATIENT)
Dept: FAMILY MEDICINE CLINIC | Facility: CLINIC | Age: 62
End: 2022-04-25

## 2022-04-25 VITALS
HEIGHT: 66 IN | DIASTOLIC BLOOD PRESSURE: 80 MMHG | RESPIRATION RATE: 12 BRPM | SYSTOLIC BLOOD PRESSURE: 141 MMHG | OXYGEN SATURATION: 98 % | BODY MASS INDEX: 27.35 KG/M2 | HEART RATE: 78 BPM | TEMPERATURE: 97.3 F | WEIGHT: 170.2 LBS

## 2022-04-25 DIAGNOSIS — I25.10 CORONARY ARTERY DISEASE INVOLVING NATIVE CORONARY ARTERY OF NATIVE HEART WITHOUT ANGINA PECTORIS: ICD-10-CM

## 2022-04-25 DIAGNOSIS — I10 HYPERTENSION, UNSPECIFIED TYPE: ICD-10-CM

## 2022-04-25 DIAGNOSIS — R73.03 PREDIABETES: Primary | ICD-10-CM

## 2022-04-25 DIAGNOSIS — E78.2 MIXED HYPERLIPIDEMIA: Chronic | ICD-10-CM

## 2022-04-25 PROCEDURE — 99213 OFFICE O/P EST LOW 20 MIN: CPT | Performed by: NURSE PRACTITIONER

## 2022-04-25 NOTE — PATIENT INSTRUCTIONS
Discharge instructions  Continue healthy diet, decrease breads Posta sweets break a sweat doing something fun every day  Best exercise is what she will do  Continue check blood pressure should be less than 130/80 greater than 110/70  As long as doing well follow-up in 6 months  Either  Lose 15 to 20 pounds follow American diabetes Association website diabetes.org  Or metformin 750 daily for prediabetes  Either strategy will lower your average glucose and lower chronic inflammation which is seen with prediabetes metabolic syndrome    Dysfunction well follow-up in 6 months

## 2022-04-25 NOTE — PROGRESS NOTES
"Chief Complaint  Follow-up (6 month f/u)    Subjective          Cassy Andrew presents to [facility]  Very pleasant patient here today follow-up prediabetes, mixed hyperlipidemia stable with Lipitor hypertension essential controlled nicely lisinopril checks blood pressure at home controlled not had her medicine today for lab  Takes baby aspirin daily for CAD which is stable up-to-date with cardiology  COVID in January she has had 3 vaccines  No chest pain shortness of breath or other issues presently.  A1c 6.3 last visit we will recheck      Objective   Vital Signs:   /80   Pulse 78   Temp 97.3 °F (36.3 °C) (Infrared)   Resp 12   Ht 167.6 cm (66\")   Wt 77.2 kg (170 lb 3.2 oz)   SpO2 98%   BMI 27.47 kg/m²            Physical Exam  Vitals reviewed.   Constitutional:       Appearance: Normal appearance. She is well-developed. She is not ill-appearing or diaphoretic.   HENT:      Head: Normocephalic.      Nose: Nose normal.   Eyes:      General: No scleral icterus.     Conjunctiva/sclera: Conjunctivae normal.      Pupils: Pupils are equal, round, and reactive to light.   Neck:      Thyroid: No thyromegaly.      Vascular: No JVD.      Comments: Carotids clear  Cardiovascular:      Rate and Rhythm: Normal rate and regular rhythm.      Heart sounds: Normal heart sounds. No murmur heard.    No friction rub. No gallop.   Pulmonary:      Effort: Pulmonary effort is normal. No respiratory distress.      Breath sounds: Normal breath sounds. No stridor. No wheezing or rales.   Abdominal:      General: Bowel sounds are normal. There is no distension.      Palpations: Abdomen is soft.      Tenderness: There is no abdominal tenderness.      Comments: No hepatosplenomegaly, no ascites,   Musculoskeletal:         General: No tenderness.      Cervical back: Neck supple.   Lymphadenopathy:      Cervical: No cervical adenopathy.   Skin:     General: Skin is warm and dry.      Findings: No " erythema or rash.   Neurological:      General: No focal deficit present.      Mental Status: She is alert and oriented to person, place, and time.      Deep Tendon Reflexes: Reflexes are normal and symmetric.   Psychiatric:         Behavior: Behavior normal.         Thought Content: Thought content normal.         Judgment: Judgment normal.        Result Review :                 Assessment and Plan    Diagnoses and all orders for this visit:    1. Prediabetes (Primary)  -     CBC & Differential  -     Comprehensive Metabolic Panel  -     Lipid Panel With LDL / HDL Ratio  -     Urinalysis With Microscopic If Indicated (No Culture) - Urine, Clean Catch  -     TSH Rfx On Abnormal To Free T4  -     Hemoglobin A1c    2. Mixed hyperlipidemia  -     CBC & Differential  -     Comprehensive Metabolic Panel  -     Lipid Panel With LDL / HDL Ratio  -     Urinalysis With Microscopic If Indicated (No Culture) - Urine, Clean Catch  -     TSH Rfx On Abnormal To Free T4  -     Hemoglobin A1c    3. Hypertension, unspecified type  -     CBC & Differential  -     Comprehensive Metabolic Panel  -     Lipid Panel With LDL / HDL Ratio  -     Urinalysis With Microscopic If Indicated (No Culture) - Urine, Clean Catch  -     TSH Rfx On Abnormal To Free T4  -     Hemoglobin A1c    4. Coronary artery disease involving native coronary artery of native heart without angina pectoris  -     CBC & Differential  -     Comprehensive Metabolic Panel  -     Lipid Panel With LDL / HDL Ratio  -     Urinalysis With Microscopic If Indicated (No Culture) - Urine, Clean Catch  -     TSH Rfx On Abnormal To Free T4  -     Hemoglobin A1c        Follow Up   Return in about 6 months (around 10/25/2022), or Labs today labs before next appointment please.  Patient was given instructions and counseling regarding her condition or for health maintenance advice. Please see specific information pulled into the AVS if appropriate.       Answers for HPI/ROS submitted by  the patient on 4/23/2022  Please describe your symptoms.: this is a follow-up visit - high blood pressure, heart disease  Have you had these symptoms before?: Yes  How long have you been having these symptoms?: Greater than 2 weeks  Please list any medications you are currently taking for this condition.: Aspirin, Atorvastatin, Lisinopril, Metoprolol ER  What is the primary reason for your visit?: Other    Discharge instructions  Continue healthy diet, decrease breads Posta sweets break a sweat doing something fun every day  Best exercise is what she will do  Continue check blood pressure should be less than 130/80 greater than 110/70  As long as doing well follow-up in 6 months  Either  Lose 15 to 20 pounds follow American diabetes Association website diabetes.org  Or metformin 750 daily for prediabetes  Either strategy will lower your average glucose and lower chronic inflammation which is seen with prediabetes metabolic syndrome    Dysfunction well follow-up in 6 months

## 2022-04-26 LAB
ALBUMIN SERPL-MCNC: 4.7 G/DL (ref 3.8–4.8)
ALBUMIN/GLOB SERPL: 1.9 {RATIO} (ref 1.2–2.2)
ALP SERPL-CCNC: 94 IU/L (ref 44–121)
ALT SERPL-CCNC: 25 IU/L (ref 0–32)
APPEARANCE UR: CLEAR
AST SERPL-CCNC: 20 IU/L (ref 0–40)
BASOPHILS # BLD AUTO: 0.1 X10E3/UL (ref 0–0.2)
BASOPHILS NFR BLD AUTO: 1 %
BILIRUB SERPL-MCNC: 0.4 MG/DL (ref 0–1.2)
BILIRUB UR QL STRIP: NEGATIVE
BUN SERPL-MCNC: 16 MG/DL (ref 8–27)
BUN/CREAT SERPL: 16 (ref 12–28)
CALCIUM SERPL-MCNC: 9.7 MG/DL (ref 8.7–10.3)
CHLORIDE SERPL-SCNC: 106 MMOL/L (ref 96–106)
CHOLEST SERPL-MCNC: 135 MG/DL (ref 100–199)
CO2 SERPL-SCNC: 18 MMOL/L (ref 20–29)
COLOR UR: YELLOW
CREAT SERPL-MCNC: 0.98 MG/DL (ref 0.57–1)
EGFRCR SERPLBLD CKD-EPI 2021: 65 ML/MIN/1.73
EOSINOPHIL # BLD AUTO: 0.2 X10E3/UL (ref 0–0.4)
EOSINOPHIL NFR BLD AUTO: 3 %
ERYTHROCYTE [DISTWIDTH] IN BLOOD BY AUTOMATED COUNT: 12.5 % (ref 11.7–15.4)
GLOBULIN SER CALC-MCNC: 2.5 G/DL (ref 1.5–4.5)
GLUCOSE SERPL-MCNC: 103 MG/DL (ref 65–99)
GLUCOSE UR QL STRIP: NEGATIVE
HBA1C MFR BLD: 6.4 % (ref 4.8–5.6)
HCT VFR BLD AUTO: 45.3 % (ref 34–46.6)
HDLC SERPL-MCNC: 59 MG/DL
HGB BLD-MCNC: 15.3 G/DL (ref 11.1–15.9)
HGB UR QL STRIP: NEGATIVE
IMM GRANULOCYTES # BLD AUTO: 0 X10E3/UL (ref 0–0.1)
IMM GRANULOCYTES NFR BLD AUTO: 0 %
KETONES UR QL STRIP: NEGATIVE
LDLC SERPL CALC-MCNC: 58 MG/DL (ref 0–99)
LDLC/HDLC SERPL: 1 RATIO (ref 0–3.2)
LEUKOCYTE ESTERASE UR QL STRIP: NEGATIVE
LYMPHOCYTES # BLD AUTO: 2.4 X10E3/UL (ref 0.7–3.1)
LYMPHOCYTES NFR BLD AUTO: 45 %
MCH RBC QN AUTO: 29.3 PG (ref 26.6–33)
MCHC RBC AUTO-ENTMCNC: 33.8 G/DL (ref 31.5–35.7)
MCV RBC AUTO: 87 FL (ref 79–97)
MICRO URNS: NORMAL
MONOCYTES # BLD AUTO: 0.4 X10E3/UL (ref 0.1–0.9)
MONOCYTES NFR BLD AUTO: 8 %
NEUTROPHILS # BLD AUTO: 2.3 X10E3/UL (ref 1.4–7)
NEUTROPHILS NFR BLD AUTO: 43 %
NITRITE UR QL STRIP: NEGATIVE
PH UR STRIP: 5.5 [PH] (ref 5–7.5)
PLATELET # BLD AUTO: 285 X10E3/UL (ref 150–450)
POTASSIUM SERPL-SCNC: 4.4 MMOL/L (ref 3.5–5.2)
PROT SERPL-MCNC: 7.2 G/DL (ref 6–8.5)
PROT UR QL STRIP: NEGATIVE
RBC # BLD AUTO: 5.22 X10E6/UL (ref 3.77–5.28)
SODIUM SERPL-SCNC: 143 MMOL/L (ref 134–144)
SP GR UR STRIP: 1.02 (ref 1–1.03)
TRIGL SERPL-MCNC: 95 MG/DL (ref 0–149)
TSH SERPL DL<=0.005 MIU/L-ACNC: 2.51 UIU/ML (ref 0.45–4.5)
UROBILINOGEN UR STRIP-MCNC: 0.2 MG/DL (ref 0.2–1)
VLDLC SERPL CALC-MCNC: 18 MG/DL (ref 5–40)
WBC # BLD AUTO: 5.4 X10E3/UL (ref 3.4–10.8)

## 2022-05-18 ENCOUNTER — TELEPHONE (OUTPATIENT)
Dept: FAMILY MEDICINE CLINIC | Facility: CLINIC | Age: 62
End: 2022-05-18

## 2022-05-18 NOTE — TELEPHONE ENCOUNTER
PT CALLED IN ABOUT VM LEFT FOR HER. READ OFF HER LAB RESULTS AND MESSAGE LEFT FOR HER ON 5/18/2022 PATIENT DOES NOT HAVE ANY QUESTIONS OR CONCERNS.

## 2022-06-09 RX ORDER — ATORVASTATIN CALCIUM 40 MG/1
40 TABLET, FILM COATED ORAL DAILY
Qty: 90 TABLET | Refills: 1 | Status: SHIPPED | OUTPATIENT
Start: 2022-06-09 | End: 2022-06-10 | Stop reason: SDUPTHER

## 2022-06-09 RX ORDER — ATORVASTATIN CALCIUM 40 MG/1
40 TABLET, FILM COATED ORAL DAILY
Qty: 90 TABLET | Refills: 2 | Status: SHIPPED | OUTPATIENT
Start: 2022-06-09 | End: 2022-06-09

## 2022-06-09 NOTE — TELEPHONE ENCOUNTER
Rx Refill Note  Requested Prescriptions     Signed Prescriptions Disp Refills   • atorvastatin (LIPITOR) 40 MG tablet 90 tablet 1     Sig: TAKE 1 TABLET BY MOUTH DAILY     Authorizing Provider: BOYD DICKENS III     Ordering User: DEEPTHI GALEANA      Last office visit with prescribing clinician: 1/3/2022      Next office visit with prescribing clinician: 1/9/2023            Deepthi Day MA  06/09/22, 13:04 EDT

## 2022-06-10 RX ORDER — ATORVASTATIN CALCIUM 40 MG/1
40 TABLET, FILM COATED ORAL DAILY
Qty: 90 TABLET | Refills: 3 | Status: SHIPPED | OUTPATIENT
Start: 2022-06-10

## 2022-09-09 DIAGNOSIS — I25.10 CORONARY ARTERY DISEASE INVOLVING NATIVE CORONARY ARTERY OF NATIVE HEART WITHOUT ANGINA PECTORIS: ICD-10-CM

## 2022-09-09 RX ORDER — METOPROLOL SUCCINATE 25 MG/1
TABLET, EXTENDED RELEASE ORAL
Qty: 90 TABLET | Refills: 0 | Status: SHIPPED | OUTPATIENT
Start: 2022-09-09 | End: 2022-12-19

## 2022-09-09 RX ORDER — LISINOPRIL 10 MG/1
TABLET ORAL
Qty: 90 TABLET | Refills: 0 | Status: SHIPPED | OUTPATIENT
Start: 2022-09-09 | End: 2022-12-19

## 2022-10-18 DIAGNOSIS — I10 HYPERTENSION, UNSPECIFIED TYPE: ICD-10-CM

## 2022-10-18 DIAGNOSIS — I10 PRIMARY HYPERTENSION: ICD-10-CM

## 2022-10-18 DIAGNOSIS — E78.2 MIXED HYPERLIPIDEMIA: ICD-10-CM

## 2022-10-18 DIAGNOSIS — R73.03 PREDIABETES: Primary | ICD-10-CM

## 2022-10-18 DIAGNOSIS — I10 ESSENTIAL HYPERTENSION: ICD-10-CM

## 2022-10-18 DIAGNOSIS — Z00.00 HEALTH CARE MAINTENANCE: ICD-10-CM

## 2022-10-19 LAB
ALBUMIN SERPL-MCNC: 4.7 G/DL (ref 3.5–5.2)
ALBUMIN/GLOB SERPL: 2.5 G/DL
ALP SERPL-CCNC: 86 U/L (ref 39–117)
ALT SERPL-CCNC: 22 U/L (ref 1–33)
APPEARANCE UR: CLEAR
AST SERPL-CCNC: 20 U/L (ref 1–32)
BACTERIA #/AREA URNS HPF: ABNORMAL /HPF
BASOPHILS # BLD AUTO: 0.05 10*3/MM3 (ref 0–0.2)
BASOPHILS NFR BLD AUTO: 1 % (ref 0–1.5)
BILIRUB SERPL-MCNC: 0.5 MG/DL (ref 0–1.2)
BILIRUB UR QL STRIP: NEGATIVE
BUN SERPL-MCNC: 15 MG/DL (ref 8–23)
BUN/CREAT SERPL: 15.2 (ref 7–25)
CALCIUM SERPL-MCNC: 9.6 MG/DL (ref 8.6–10.5)
CASTS URNS MICRO: ABNORMAL
CHLORIDE SERPL-SCNC: 103 MMOL/L (ref 98–107)
CHOLEST SERPL-MCNC: 135 MG/DL (ref 0–200)
CO2 SERPL-SCNC: 27 MMOL/L (ref 22–29)
COLOR UR: YELLOW
CREAT SERPL-MCNC: 0.99 MG/DL (ref 0.57–1)
EGFRCR SERPLBLD CKD-EPI 2021: 64.2 ML/MIN/1.73
EOSINOPHIL # BLD AUTO: 0.12 10*3/MM3 (ref 0–0.4)
EOSINOPHIL NFR BLD AUTO: 2.3 % (ref 0.3–6.2)
EPI CELLS #/AREA URNS HPF: ABNORMAL /HPF
ERYTHROCYTE [DISTWIDTH] IN BLOOD BY AUTOMATED COUNT: 13.2 % (ref 12.3–15.4)
GLOBULIN SER CALC-MCNC: 1.9 GM/DL
GLUCOSE SERPL-MCNC: 110 MG/DL (ref 65–99)
GLUCOSE UR QL STRIP: NEGATIVE
HBA1C MFR BLD: 6.1 % (ref 4.8–5.6)
HCT VFR BLD AUTO: 43.9 % (ref 34–46.6)
HDLC SERPL-MCNC: 51 MG/DL (ref 40–60)
HGB BLD-MCNC: 14.4 G/DL (ref 12–15.9)
HGB UR QL STRIP: NEGATIVE
IMM GRANULOCYTES # BLD AUTO: 0.01 10*3/MM3 (ref 0–0.05)
IMM GRANULOCYTES NFR BLD AUTO: 0.2 % (ref 0–0.5)
KETONES UR QL STRIP: NEGATIVE
LDLC SERPL CALC-MCNC: 65 MG/DL (ref 0–100)
LEUKOCYTE ESTERASE UR QL STRIP: NEGATIVE
LYMPHOCYTES # BLD AUTO: 2 10*3/MM3 (ref 0.7–3.1)
LYMPHOCYTES NFR BLD AUTO: 38.2 % (ref 19.6–45.3)
MCH RBC QN AUTO: 28.8 PG (ref 26.6–33)
MCHC RBC AUTO-ENTMCNC: 32.8 G/DL (ref 31.5–35.7)
MCV RBC AUTO: 87.8 FL (ref 79–97)
MONOCYTES # BLD AUTO: 0.41 10*3/MM3 (ref 0.1–0.9)
MONOCYTES NFR BLD AUTO: 7.8 % (ref 5–12)
NEUTROPHILS # BLD AUTO: 2.65 10*3/MM3 (ref 1.7–7)
NEUTROPHILS NFR BLD AUTO: 50.5 % (ref 42.7–76)
NITRITE UR QL STRIP: NEGATIVE
NRBC BLD AUTO-RTO: 0 /100 WBC (ref 0–0.2)
PH UR STRIP: 6 [PH] (ref 5–8)
PLATELET # BLD AUTO: 239 10*3/MM3 (ref 140–450)
POTASSIUM SERPL-SCNC: 4.5 MMOL/L (ref 3.5–5.2)
PROT SERPL-MCNC: 6.6 G/DL (ref 6–8.5)
PROT UR QL STRIP: NEGATIVE
RBC # BLD AUTO: 5 10*6/MM3 (ref 3.77–5.28)
RBC #/AREA URNS HPF: ABNORMAL /HPF
SODIUM SERPL-SCNC: 141 MMOL/L (ref 136–145)
SP GR UR STRIP: 1.01 (ref 1–1.03)
TRIGL SERPL-MCNC: 100 MG/DL (ref 0–150)
TSH SERPL DL<=0.005 MIU/L-ACNC: 2.04 UIU/ML (ref 0.27–4.2)
UROBILINOGEN UR STRIP-MCNC: NORMAL MG/DL
VLDLC SERPL CALC-MCNC: 19 MG/DL (ref 5–40)
WBC # BLD AUTO: 5.24 10*3/MM3 (ref 3.4–10.8)
WBC #/AREA URNS HPF: ABNORMAL /HPF

## 2022-10-25 ENCOUNTER — OFFICE VISIT (OUTPATIENT)
Dept: FAMILY MEDICINE CLINIC | Facility: CLINIC | Age: 62
End: 2022-10-25

## 2022-10-25 VITALS
BODY MASS INDEX: 23.29 KG/M2 | TEMPERATURE: 98 F | HEART RATE: 81 BPM | SYSTOLIC BLOOD PRESSURE: 135 MMHG | RESPIRATION RATE: 14 BRPM | DIASTOLIC BLOOD PRESSURE: 85 MMHG | OXYGEN SATURATION: 96 % | WEIGHT: 144.9 LBS | HEIGHT: 66 IN

## 2022-10-25 DIAGNOSIS — R73.03 PREDIABETES: ICD-10-CM

## 2022-10-25 DIAGNOSIS — I25.10 CORONARY ARTERY DISEASE INVOLVING NATIVE CORONARY ARTERY OF NATIVE HEART WITHOUT ANGINA PECTORIS: Chronic | ICD-10-CM

## 2022-10-25 DIAGNOSIS — Z78.0 POSTMENOPAUSAL: ICD-10-CM

## 2022-10-25 DIAGNOSIS — I10 PRIMARY HYPERTENSION: Primary | ICD-10-CM

## 2022-10-25 DIAGNOSIS — E78.2 MIXED HYPERLIPIDEMIA: Chronic | ICD-10-CM

## 2022-10-25 DIAGNOSIS — Z12.31 ENCOUNTER FOR SCREENING MAMMOGRAM FOR BREAST CANCER: ICD-10-CM

## 2022-10-25 PROCEDURE — 99213 OFFICE O/P EST LOW 20 MIN: CPT | Performed by: NURSE PRACTITIONER

## 2022-10-25 RX ORDER — LANCETS 28 GAUGE
EACH MISCELLANEOUS
Qty: 200 EACH | Refills: 12 | Status: SHIPPED | OUTPATIENT
Start: 2022-10-25

## 2022-10-25 RX ORDER — BLOOD-GLUCOSE METER
1 KIT MISCELLANEOUS 3 TIMES DAILY
Qty: 1 EACH | Refills: 0 | Status: SHIPPED | OUTPATIENT
Start: 2022-10-25

## 2022-11-09 ENCOUNTER — HOSPITAL ENCOUNTER (OUTPATIENT)
Dept: MAMMOGRAPHY | Facility: HOSPITAL | Age: 62
Discharge: HOME | End: 2022-11-09
Admitting: NURSE PRACTITIONER

## 2022-11-09 PROCEDURE — 77067 SCR MAMMO BI INCL CAD: CPT

## 2022-11-09 PROCEDURE — 77063 BREAST TOMOSYNTHESIS BI: CPT

## 2022-12-19 DIAGNOSIS — I25.10 CORONARY ARTERY DISEASE INVOLVING NATIVE CORONARY ARTERY OF NATIVE HEART WITHOUT ANGINA PECTORIS: ICD-10-CM

## 2022-12-19 RX ORDER — LISINOPRIL 10 MG/1
TABLET ORAL
Qty: 90 TABLET | Refills: 0 | Status: SHIPPED | OUTPATIENT
Start: 2022-12-19

## 2022-12-19 RX ORDER — METOPROLOL SUCCINATE 25 MG/1
TABLET, EXTENDED RELEASE ORAL
Qty: 90 TABLET | Refills: 0 | Status: SHIPPED | OUTPATIENT
Start: 2022-12-19

## 2023-01-09 ENCOUNTER — OFFICE VISIT (OUTPATIENT)
Dept: CARDIOLOGY | Facility: CLINIC | Age: 63
End: 2023-01-09
Payer: COMMERCIAL

## 2023-01-09 VITALS
WEIGHT: 148 LBS | SYSTOLIC BLOOD PRESSURE: 122 MMHG | HEART RATE: 61 BPM | BODY MASS INDEX: 23.78 KG/M2 | HEIGHT: 66 IN | DIASTOLIC BLOOD PRESSURE: 80 MMHG

## 2023-01-09 DIAGNOSIS — Z95.5 S/P DRUG ELUTING CORONARY STENT PLACEMENT: Chronic | ICD-10-CM

## 2023-01-09 DIAGNOSIS — I44.7 LBBB (LEFT BUNDLE BRANCH BLOCK): Chronic | ICD-10-CM

## 2023-01-09 DIAGNOSIS — I21.21 ST ELEVATION MYOCARDIAL INFARCTION INVOLVING LEFT CIRCUMFLEX CORONARY ARTERY: Chronic | ICD-10-CM

## 2023-01-09 DIAGNOSIS — I25.10 CORONARY ARTERY DISEASE INVOLVING NATIVE CORONARY ARTERY OF NATIVE HEART WITHOUT ANGINA PECTORIS: Primary | Chronic | ICD-10-CM

## 2023-01-09 DIAGNOSIS — E78.2 MIXED HYPERLIPIDEMIA: Chronic | ICD-10-CM

## 2023-01-09 PROBLEM — Z86.16 HISTORY OF 2019 NOVEL CORONAVIRUS DISEASE (COVID-19): Status: ACTIVE | Noted: 2023-01-09

## 2023-01-09 PROCEDURE — 93000 ELECTROCARDIOGRAM COMPLETE: CPT | Performed by: INTERNAL MEDICINE

## 2023-01-09 PROCEDURE — 99214 OFFICE O/P EST MOD 30 MIN: CPT | Performed by: INTERNAL MEDICINE

## 2023-01-09 NOTE — PROGRESS NOTES
Subjective:     Encounter Date: 01/09/23        Patient ID: Cassy Andrew is a 62 y.o. female.    Chief Complaint: CAD  History of Present Illness    This is a pleasant 58-year-old female who comes in for f/u of her cardiac status.  She has a history of CAD.    It has been 1 year since her last visit.  She has been doing great without any complaints.  No chest pain or chest discomfort, no shortness of breath, no palpitations or tachycardia, no presyncope or syncope.  No orthopnea or PND.    She  moved to [place] in 2018.  She previously lived in [facility].    Interpretation Summary    • Findings consistent with a normal ECG stress test.  • Left ventricular ejection fraction is hyperdynamic (Calculated EF > 70%). .  • Myocardial perfusion imaging indicates a medium-sized infarct located in the basal to apical inferoseptal wall with no significant ischemia noted.  • There is no prior study available for comparison.      She has a history of CAD.  She myocardial infarction with a left circumflex stent placed May 2015.  Echocardiogram afterwards showed an ejection fraction of 55-60 percent.  She has a history of hypertension and is on medical therapy.  She has a history of hyperlipidemia on lipid-lowering therapy.  She has been on chronic Plavix therapy ever since her intervention.    Her cardiac catheterization was performed in May 2011 2015.  This showed normal left main, normal LAD with a single diagonal, and complete occlusion of the mid circumflex there was treated with a drug eluding stent.  No other disease was seen in the circumflex.  Right coronary artery was a small dominant vessel with no disease seen.    The following portions of the patient's history were reviewed and updated as appropriate: allergies, current medications, past family history, past medical history, past social history, past surgical history and problem list.    Past Medical History:   Diagnosis Date   • CAD (coronary artery  disease)    • CVD (cardiovascular disease)    • Heart attack (HCC)    • Hyperlipidemia    • Hypertension    • LBBB (left bundle branch block) 10/08/2018   • Mixed hyperlipidemia 10/08/2018   • Myocardial infarction (HCC)    • S/P drug eluting coronary stent placement 10/08/2018   • ST elevation myocardial infarction involving left circumflex coronary artery (HCC) 10/08/2018       Past Surgical History:   Procedure Laterality Date   • CARDIAC CATHETERIZATION     • CORONARY STENT PLACEMENT     • HYSTERECTOMY         Social History     Socioeconomic History   • Marital status: Single   Tobacco Use   • Smoking status: Never   • Smokeless tobacco: Never   • Tobacco comments:     caff use   Substance and Sexual Activity   • Alcohol use: No   • Drug use: No   • Sexual activity: Never           ECG 12 Lead    Date/Time: 1/9/2023 12:15 PM  Performed by: Naveen Patel III, MD  Authorized by: Naveen Patel III, MD   Comparison: compared with previous ECG   Similar to previous ECG  Rhythm: sinus rhythm  Rate: normal  Conduction: left bundle branch block  QRS axis: normal    Clinical impression: abnormal EKG               Objective:     Vitals:    01/09/23 1008   BP: 122/80   Pulse: 61   Weight: 67.1 kg (148 lb)   Height: 167.6 cm (66\")           General Appearance:    Alert, cooperative, in no acute distress   Head:    Normocephalic, without obvious abnormality   Eyes:            Lids and lashes normal, conjunctivae and sclerae normal, no icterus, no pallor, corneas clear   Ears:    Ears appear intact with no abnormalities noted   Throat:   No oral lesions, oral mucosa moist   Neck:   No adenopathy, supple, trachea midline, no thyromegaly, no carotid bruit, no JVD   Back:     No kyphosis present, no erythema or scars, no tenderness to palpation    Lungs:     Clear to auscultation,respirations regular, even and unlabored    Heart:    Regular rhythm and normal rate, normal S1 and S2, no murmur, no gallop, no rub, no click    Chest Wall:    No abnormalities observed   Abdomen:     Normal bowel sounds, no masses, no organomegaly, soft        non-tender, non-distended, no guarding   Extremities:   Moves all extremities well, no edema, no cyanosis, no redness   Pulses:  Bilateral carotids brisk   Skin:  Psychiatric:   No bleeding or rash    Alert and oriented, normal mood and affect       Lab Review:             Lab Results   Component Value Date    GLUCOSE 110 (H) 10/18/2022    BUN 15 10/18/2022    CREATININE 0.99 10/18/2022    EGFRIFNONA 61 10/18/2021    EGFRIFAFRI 71 10/18/2021    BCR 15.2 10/18/2022    K 4.5 10/18/2022    CO2 27.0 10/18/2022    CALCIUM 9.6 10/18/2022    PROTENTOTREF 6.6 10/18/2022    ALBUMIN 4.70 10/18/2022    LABIL2 2.5 10/18/2022    AST 20 10/18/2022    ALT 22 10/18/2022               Assessment:          Diagnosis Plan   1. Coronary artery disease involving native coronary artery of native heart without angina pectoris  ECG 12 Lead      2. S/P drug eluting coronary stent placement  ECG 12 Lead      3. ST elevation myocardial infarction involving left circumflex coronary artery (HCC)  ECG 12 Lead      4. Mixed hyperlipidemia  ECG 12 Lead      5. LBBB (left bundle branch block)  ECG 12 Lead             Plan:       1. Coronary Artery Disease  Assessment  • The patient has no angina    Plan  • Lifestyle modifications discussed include adhering to a heart healthy diet, maintenance of a healthy weight, medication compliance, regular exercise and regular monitoring of cholesterol and blood pressure    Subjective - Objective  • There is a history of past MI  • There has been a previous stent procedure using TRE  • Current antiplatelet therapy includes aspirin 81 mg    2.  Mixed hyperlipidemia- cont lipid lowering therapy, AST, ALT reviewed    3.  Left bundle branch block        Thank you very much for allowing us to participate in the care of this pleasant patient.  Please don't hesitate to call if I can be of assistance  in any way.      Current Outpatient Medications:   •  aspirin 81 MG EC tablet, Take 81 mg by mouth Daily., Disp: , Rfl:   •  atorvastatin (LIPITOR) 40 MG tablet, Take 1 tablet by mouth Daily., Disp: 90 tablet, Rfl: 3  •  glucose blood test strip, Use as instructed. Please check blood sugars 3 times a day after every meal. Please prescribe to pt what insurance will cover. DX:R73.03, Disp: 200 each, Rfl: 12  •  glucose monitor monitoring kit, 1 each 3 (Three) Times a Day. Use as directed. Please check blood sugars 3 times a day after every meal. Please prescribe to pt what insurance will cover. DX:R73.03, Disp: 1 each, Rfl: 0  •  Lancets (freestyle) lancets, Use as directed. Please check blood sugars 3 times a day after every meal. Please prescribe to pt what insurance will cover. DX:R73.03, Disp: 200 each, Rfl: 12  •  lisinopril (PRINIVIL,ZESTRIL) 10 MG tablet, TAKE 1 TABLET BY MOUTH EVERY DAY, Disp: 90 tablet, Rfl: 0  •  metoprolol succinate XL (TOPROL-XL) 25 MG 24 hr tablet, TAKE 1 TABLET BY MOUTH DAILY, Disp: 90 tablet, Rfl: 0

## 2023-02-21 ENCOUNTER — HOSPITAL ENCOUNTER (OUTPATIENT)
Dept: BONE DENSITY | Facility: HOSPITAL | Age: 63
Discharge: HOME | End: 2023-02-21
Admitting: NURSE PRACTITIONER
Payer: COMMERCIAL

## 2023-02-21 PROCEDURE — 77080 DXA BONE DENSITY AXIAL: CPT

## 2023-04-25 ENCOUNTER — TELEPHONE (OUTPATIENT)
Dept: FAMILY MEDICINE CLINIC | Facility: CLINIC | Age: 63
End: 2023-04-25

## 2023-04-25 ENCOUNTER — OFFICE VISIT (OUTPATIENT)
Dept: FAMILY MEDICINE CLINIC | Facility: CLINIC | Age: 63
End: 2023-04-25
Payer: COMMERCIAL

## 2023-04-25 VITALS
WEIGHT: 154.2 LBS | BODY MASS INDEX: 24.78 KG/M2 | HEART RATE: 75 BPM | DIASTOLIC BLOOD PRESSURE: 71 MMHG | HEIGHT: 66 IN | TEMPERATURE: 97.1 F | OXYGEN SATURATION: 97 % | RESPIRATION RATE: 14 BRPM | SYSTOLIC BLOOD PRESSURE: 148 MMHG

## 2023-04-25 DIAGNOSIS — E78.2 MIXED HYPERLIPIDEMIA: ICD-10-CM

## 2023-04-25 DIAGNOSIS — R73.03 PREDIABETES: ICD-10-CM

## 2023-04-25 DIAGNOSIS — I10 PRIMARY HYPERTENSION: Primary | ICD-10-CM

## 2023-04-25 DIAGNOSIS — I10 ESSENTIAL HYPERTENSION: ICD-10-CM

## 2023-04-25 DIAGNOSIS — I10 HYPERTENSION, UNSPECIFIED TYPE: ICD-10-CM

## 2023-04-25 DIAGNOSIS — I25.10 CORONARY ARTERY DISEASE INVOLVING NATIVE CORONARY ARTERY OF NATIVE HEART WITHOUT ANGINA PECTORIS: Chronic | ICD-10-CM

## 2023-04-25 PROCEDURE — 99213 OFFICE O/P EST LOW 20 MIN: CPT | Performed by: NURSE PRACTITIONER

## 2023-04-25 NOTE — PATIENT INSTRUCTIONS
Discharge instructions continue  Present plan I think you doing danya,  Nothing else to add regarding your diabetes should you decide ever to add Jardiance or metformin simply let me know you may want to read about the benefits of these with prediabetes  Or to decrease risk of stroke and heart attack with Jardiance  Which are doing danya years as long as you are doing well I will see you back in 6 months and labs before next appointment.  Continue check blood pressure weekly to make sure you get the benefit to decrease risk of kidney failures related to chronic hypertension you are doing spectacular your kidneys will last you a very long time.

## 2023-04-25 NOTE — PROGRESS NOTES
"Chief Complaint  Hypertension and Diabetes (6 month f/u)    Subjective        Cassy Andrew presents to [facility]  History of Present Illness  Pleasant patient here today follow-up essential hypertension controlled nicely at home she is very good about monitor her blood pressure mixed hyperlipidemia history of CAD all stable and takes a statin appropriately, she has some mild prediabetes slightly elevated A1c and she is doing spectacular keeping her carbohydrates less than 50 and she has a danya weight and doing well nonetheless she still has a borderline sugar and this is likely related to genetics and function of her beta cells,  Nothing else to do here nicely controlled.  She is up-to-date with cardiology,  She walks 20 miles a week plus.  Hypertension    Diabetes        Objective   Vital Signs:  /71   Pulse 75   Temp 97.1 °F (36.2 °C) (Temporal)   Resp 14   Ht 167.6 cm (66\")   Wt 69.9 kg (154 lb 3.2 oz)   SpO2 97%   BMI 24.89 kg/m²   Estimated body mass index is 24.89 kg/m² as calculated from the following:    Height as of this encounter: 167.6 cm (66\").    Weight as of this encounter: 69.9 kg (154 lb 3.2 oz).    BMI is within normal parameters. No other follow-up for BMI required.      Physical Exam  Vitals reviewed.   Constitutional:       General: She is not in acute distress.     Appearance: She is well-developed. She is not ill-appearing, toxic-appearing or diaphoretic.      Comments: Garrison vibrant appears well   HENT:      Head: Normocephalic.      Nose: Nose normal.   Eyes:      General: No scleral icterus.     Conjunctiva/sclera: Conjunctivae normal.      Pupils: Pupils are equal, round, and reactive to light.   Neck:      Thyroid: No thyromegaly.      Vascular: No JVD.   Cardiovascular:      Rate and Rhythm: Normal rate and regular rhythm.      Heart sounds: Normal heart sounds. No murmur heard.    No friction rub. No gallop.   Pulmonary:      Effort: " Pulmonary effort is normal. No respiratory distress.      Breath sounds: Normal breath sounds. No stridor. No wheezing or rales.   Abdominal:      General: Bowel sounds are normal. There is no distension.      Palpations: Abdomen is soft.      Tenderness: There is no abdominal tenderness.      Comments: No hepatosplenomegaly, no ascites,   Musculoskeletal:         General: No tenderness.      Cervical back: Neck supple.   Lymphadenopathy:      Cervical: No cervical adenopathy.   Skin:     General: Skin is warm and dry.      Findings: No erythema or rash.   Neurological:      General: No focal deficit present.      Mental Status: She is alert and oriented to person, place, and time.      Deep Tendon Reflexes: Reflexes are normal and symmetric.   Psychiatric:         Mood and Affect: Mood normal.         Behavior: Behavior normal.         Thought Content: Thought content normal.         Judgment: Judgment normal.        Result Review :                Assessment and Plan   Diagnoses and all orders for this visit:    1. Primary hypertension (Primary)    2. Prediabetes    3. Coronary artery disease involving native coronary artery of native heart without angina pectoris             Follow Up   Return in about 6 months (around 10/25/2023) for Labs before next visit.  Patient was given instructions and counseling regarding her condition or for health maintenance advice. Please see specific information pulled into the AVS if appropriate.     Hypertension controlled at home prediabetes she is keeping carbs less than 50 healthy diet really low-carb doing well here she prefers no metformin or Jardiance at this time  CAD stable continue plan  Mixed hyperlipidemia continue statin risk-benefit discussed benefit much outweighs risk      Patient Instructions   Discharge instructions continue  Present plan I think you doing danya,  Nothing else to add regarding your diabetes should you decide ever to add Jardiance or metformin  simply let me know you may want to read about the benefits of these with prediabetes  Or to decrease risk of stroke and heart attack with Jardiance  Which are doing adnya years as long as you are doing well I will see you back in 6 months and labs before next appointment.  Continue check blood pressure weekly to make sure you get the benefit to decrease risk of kidney failures related to chronic hypertension you are doing spectacular your kidneys will last you a very long time.         Answers for HPI/ROS submitted by the patient on 4/24/2023  Please describe your symptoms.: Follow up 6 month appointment  Have you had these symptoms before?: Yes  How long have you been having these symptoms?: Greater than 2 weeks  What is the primary reason for your visit?: Other

## 2023-06-12 ENCOUNTER — TELEPHONE (OUTPATIENT)
Dept: CARDIOLOGY | Facility: CLINIC | Age: 63
End: 2023-06-12
Payer: COMMERCIAL

## 2023-06-12 RX ORDER — METOPROLOL SUCCINATE 25 MG/1
25 TABLET, EXTENDED RELEASE ORAL DAILY
Qty: 90 TABLET | Refills: 3 | Status: SHIPPED | OUTPATIENT
Start: 2023-06-12

## 2023-06-12 RX ORDER — ATORVASTATIN CALCIUM 40 MG/1
40 TABLET, FILM COATED ORAL DAILY
Qty: 90 TABLET | Refills: 3 | Status: SHIPPED | OUTPATIENT
Start: 2023-06-12

## 2023-06-12 NOTE — TELEPHONE ENCOUNTER
Faxed paper Rx refill request for Lisinopril 10 MG tab to [facility] [phone]. Received fax confirmation.

## 2023-06-12 NOTE — TELEPHONE ENCOUNTER
Faxed paper Rx refill for Metoprolol ER Succ 25 MG tablet to [facility] [phone]. Received fax confirmation .

## 2023-10-25 ENCOUNTER — OFFICE VISIT (OUTPATIENT)
Dept: FAMILY MEDICINE CLINIC | Facility: CLINIC | Age: 63
End: 2023-10-25
Payer: COMMERCIAL

## 2023-10-25 ENCOUNTER — TELEPHONE (OUTPATIENT)
Dept: FAMILY MEDICINE CLINIC | Facility: CLINIC | Age: 63
End: 2023-10-25

## 2023-10-25 VITALS
DIASTOLIC BLOOD PRESSURE: 78 MMHG | SYSTOLIC BLOOD PRESSURE: 126 MMHG | HEART RATE: 76 BPM | BODY MASS INDEX: 26.05 KG/M2 | HEIGHT: 66 IN | OXYGEN SATURATION: 97 % | TEMPERATURE: 97.2 F | WEIGHT: 162.1 LBS

## 2023-10-25 DIAGNOSIS — I10 PRIMARY HYPERTENSION: Primary | ICD-10-CM

## 2023-10-25 DIAGNOSIS — E78.2 MIXED HYPERLIPIDEMIA: Chronic | ICD-10-CM

## 2023-10-25 DIAGNOSIS — R73.03 PREDIABETES: ICD-10-CM

## 2023-10-25 DIAGNOSIS — Z12.11 SCREEN FOR COLON CANCER: ICD-10-CM

## 2023-10-25 DIAGNOSIS — I25.10 CORONARY ARTERY DISEASE INVOLVING NATIVE CORONARY ARTERY OF NATIVE HEART WITHOUT ANGINA PECTORIS: Chronic | ICD-10-CM

## 2023-10-25 DIAGNOSIS — E78.2 MIXED HYPERLIPIDEMIA: ICD-10-CM

## 2023-10-25 NOTE — PATIENT INSTRUCTIONS
Discharge instructions continue present care, you are taking very well good care of yourself,    Strongly encourage you send off the Cologuard nothing to lose here    Jardiance 10 mg daily to decrease risk of stroke and heart attack, as well as to improve prediabetes    Push fluids if taking, if weakness dizziness  Fatigue urgent recheck you are,    If blood pressure decreasing, call for guidance and push fluids and will hold some of her medications unchanged.  Otherwise as long as you are doing well and your blood pressure is good I will see you back in 6 months

## 2023-10-31 ENCOUNTER — TELEPHONE (OUTPATIENT)
Dept: FAMILY MEDICINE CLINIC | Facility: CLINIC | Age: 63
End: 2023-10-31

## 2023-10-31 NOTE — TELEPHONE ENCOUNTER
"  Caller: Cassy Andrew \"Cassy\"    Relationship: Self    Best call back number: [phone]     What was the call regarding: PATIENT STATES INSURANCE IS WAITING ON A PRIOR AUTHORIZATION FOR JARDIANCE. PLEASE ADVISE ONCE STARTED.  "

## 2023-10-31 NOTE — TELEPHONE ENCOUNTER
Please call patient    Please tell her insurance denied the Jardiance,  These medications have a class effect and lower risk of cardiovascular disease  In this instance she does not officially have the diabetes diagnosis and they would not except prediabetes  Therefore it would not be covered  It would be beneficial but not covered     No worries,  She should continue present plan  We could consider low-dose metformin such as 500 mg daily which will likely lower her A1c a little this would be a good option if she prefers.  Let me know thank you

## 2023-10-31 NOTE — TELEPHONE ENCOUNTER
Drug denied per insurance:  You do not meet the requirements of your plan. Your plan covers this drug when it is being  prescribed for an FDA-approved or compendia supported indication. Your request has  been denied based on the information we have.

## 2023-11-01 DIAGNOSIS — R73.03 PREDIABETES: Primary | ICD-10-CM

## 2023-11-01 RX ORDER — METFORMIN HYDROCHLORIDE 500 MG/1
500 TABLET, EXTENDED RELEASE ORAL
Qty: 90 TABLET | Refills: 3 | Status: SHIPPED | OUTPATIENT
Start: 2023-11-01

## 2023-11-01 NOTE — TELEPHONE ENCOUNTER
Called spoke to pt in detail regarding PA pt voiced understanding and is wanting to take metformin

## 2024-01-10 RX ORDER — BLOOD-GLUCOSE METER
KIT MISCELLANEOUS
Qty: 100 EACH | Refills: 11 | Status: SHIPPED | OUTPATIENT
Start: 2024-01-10

## 2024-01-10 NOTE — TELEPHONE ENCOUNTER
Rx Refill Note  Requested Prescriptions     Pending Prescriptions Disp Refills    glucose blood (FREESTYLE LITE) test strip [Pharmacy Med Name: FREESTYLE LITE BLOOD GLUCOSE STRIPS]       Sig: CHECK BLOOD GLUCOSE THREE TIMES DAILY AFTER EVERY MEAL      Last office visit with prescribing clinician: 10/25/2023   Last telemedicine visit with prescribing clinician: Visit date not found   Next office visit with prescribing clinician: 4/25/2024                         Would you like a call back once the refill request has been completed: [] Yes [] No    If the office needs to give you a call back, can they leave a voicemail: [] Yes [] No    Steven Goodwin MA  01/10/24, 08:58 EST

## 2024-01-29 ENCOUNTER — OFFICE VISIT (OUTPATIENT)
Dept: CARDIOLOGY | Facility: CLINIC | Age: 64
End: 2024-01-29
Payer: COMMERCIAL

## 2024-01-29 VITALS
BODY MASS INDEX: 26.66 KG/M2 | HEART RATE: 93 BPM | SYSTOLIC BLOOD PRESSURE: 124 MMHG | WEIGHT: 160 LBS | HEIGHT: 65 IN | OXYGEN SATURATION: 98 % | DIASTOLIC BLOOD PRESSURE: 82 MMHG | RESPIRATION RATE: 16 BRPM

## 2024-01-29 DIAGNOSIS — I44.7 LBBB (LEFT BUNDLE BRANCH BLOCK): Chronic | ICD-10-CM

## 2024-01-29 DIAGNOSIS — I25.10 CORONARY ARTERY DISEASE INVOLVING NATIVE CORONARY ARTERY OF NATIVE HEART WITHOUT ANGINA PECTORIS: Primary | Chronic | ICD-10-CM

## 2024-01-29 DIAGNOSIS — I21.21 ST ELEVATION MYOCARDIAL INFARCTION INVOLVING LEFT CIRCUMFLEX CORONARY ARTERY: Chronic | ICD-10-CM

## 2024-01-29 DIAGNOSIS — I10 PRIMARY HYPERTENSION: ICD-10-CM

## 2024-01-29 DIAGNOSIS — E78.2 MIXED HYPERLIPIDEMIA: Chronic | ICD-10-CM

## 2024-01-29 DIAGNOSIS — Z95.5 S/P DRUG ELUTING CORONARY STENT PLACEMENT: Chronic | ICD-10-CM

## 2024-01-29 PROCEDURE — 99214 OFFICE O/P EST MOD 30 MIN: CPT | Performed by: INTERNAL MEDICINE

## 2024-01-29 PROCEDURE — 93000 ELECTROCARDIOGRAM COMPLETE: CPT | Performed by: INTERNAL MEDICINE

## 2024-01-29 NOTE — PROGRESS NOTES
Subjective:     Encounter Date: 01/29/24        Patient ID: Cassy Andrew is a 63 y.o. female.    Chief Complaint: CAD  History of Present Illness    This is a pleasant 58-year-old female who comes in for f/u of her cardiac status.  She has a history of CAD.    It has been 1 year since her last visit.  She has been doing great without any complaints.  She denies any chest pain, pressure, tightness, squeezing, or heartburn.  She has not experienced any feeling of palpitations, tachycardia or heart racing and no presyncope or syncope.  There has not been any problems with dizziness or lightheadedness.  There has not been any orthopnea or PND, and no problems with lower extremity edema.  She denies any shortness of breath at rest or with activity and has not had any wheezing.  She has continued to perform daily activities of living without any specific problem or change in the level of activity.    She  moved to [place] in 2018.  She previously lived in [facility].    Interpretation Summary    Findings consistent with a normal ECG stress test.  Left ventricular ejection fraction is hyperdynamic (Calculated EF > 70%). .  Myocardial perfusion imaging indicates a medium-sized infarct located in the basal to apical inferoseptal wall with no significant ischemia noted.  There is no prior study available for comparison.      She has a history of CAD.  She myocardial infarction with a left circumflex stent placed May 2015.  Echocardiogram afterwards showed an ejection fraction of 55-60 percent.  She has a history of hypertension and is on medical therapy.  She has a history of hyperlipidemia on lipid-lowering therapy.  She has been on chronic Plavix therapy ever since her intervention.    Her cardiac catheterization was performed in May 2011 2015.  This showed normal left main, normal LAD with a single diagonal, and complete occlusion of the mid circumflex there was treated with a drug eluding stent.  No other  "disease was seen in the circumflex.  Right coronary artery was a small dominant vessel with no disease seen.    The following portions of the patient's history were reviewed and updated as appropriate: allergies, current medications, past family history, past medical history, past social history, past surgical history and problem list.    Past Medical History:   Diagnosis Date    CAD (coronary artery disease)     CVD (cardiovascular disease)     Heart attack     Hyperlipidemia     Hypertension     LBBB (left bundle branch block) 10/08/2018    Mixed hyperlipidemia 10/08/2018    Myocardial infarction     S/P drug eluting coronary stent placement 10/08/2018    ST elevation myocardial infarction involving left circumflex coronary artery 10/08/2018       Past Surgical History:   Procedure Laterality Date    ADENOIDECTOMY      CARDIAC CATHETERIZATION      CORONARY STENT PLACEMENT      HYSTERECTOMY      TONSILLECTOMY         Social History     Socioeconomic History    Marital status: Single   Tobacco Use    Smoking status: Never     Passive exposure: Never    Smokeless tobacco: Never    Tobacco comments:     caff use   Vaping Use    Vaping Use: Never used   Substance and Sexual Activity    Alcohol use: No    Drug use: No    Sexual activity: Never           ECG 12 Lead    Date/Time: 1/29/2024 10:47 AM  Performed by: Naveen Patel III, MD    Authorized by: Naveen Patel III, MD  Comparison: compared with previous ECG   Similar to previous ECG  Rhythm: sinus rhythm  Rate: normal  Conduction: incomplete left bundle branch block  QRS axis: left  Other findings: non-specific ST-T wave changes    Clinical impression: non-specific ECG             Objective:     Vitals:    01/29/24 1025   BP: 124/82   BP Location: Right arm   Patient Position: Sitting   Cuff Size: Adult   Pulse: 93   Resp: 16   SpO2: 98%   Weight: 72.6 kg (160 lb)   Height: 165.1 cm (65\")             General Appearance:    Alert, cooperative, in no acute distress "   Head:    Normocephalic, without obvious abnormality   Eyes:            Lids and lashes normal, conjunctivae and sclerae normal, no icterus, no pallor, corneas clear   Ears:    Ears appear intact with no abnormalities noted   Throat:   No oral lesions, oral mucosa moist   Neck:   No adenopathy, supple, trachea midline, no thyromegaly, no carotid bruit, no JVD   Back:     No kyphosis present, no erythema or scars, no tenderness to palpation    Lungs:     Clear to auscultation,respirations regular, even and unlabored    Heart:    Regular rhythm and normal rate, normal S1 and S2, no murmur, no gallop, no rub, no click   Chest Wall:    No abnormalities observed   Abdomen:     Normal bowel sounds, no masses, no organomegaly, soft        non-tender, non-distended, no guarding   Extremities:   Moves all extremities well, no edema, no cyanosis, no redness   Pulses:  Bilateral carotids brisk   Skin:  Psychiatric:   No bleeding or rash    Alert and oriented, normal mood and affect       Lab Review:             Lab Results   Component Value Date    GLUCOSE 124 (H) 10/18/2023    BUN 17 10/18/2023    CREATININE 1.00 10/18/2023    EGFRIFNONA 61 10/18/2021    EGFRIFAFRI 71 10/18/2021    BCR 17.0 10/18/2023    K 4.5 10/18/2023    CO2 27.0 10/18/2023    CALCIUM 9.8 10/18/2023    PROTENTOTREF 6.8 10/18/2023    ALBUMIN 4.9 10/18/2023    LABIL2 2.6 10/18/2023    AST 17 10/18/2023    ALT 18 10/18/2023               Assessment:          Diagnosis Plan   1. Coronary artery disease involving native coronary artery of native heart without angina pectoris  ECG 12 Lead      2. S/P drug eluting coronary stent placement  ECG 12 Lead      3. ST elevation myocardial infarction involving left circumflex coronary artery  ECG 12 Lead      4. Mixed hyperlipidemia  ECG 12 Lead      5. LBBB (left bundle branch block)  ECG 12 Lead      6. Primary hypertension  ECG 12 Lead               Plan:       1. Coronary Artery Disease  Assessment   The patient  has no angina    Plan   Lifestyle modifications discussed include adhering to a heart healthy diet, maintenance of a healthy weight, medication compliance, regular exercise and regular monitoring of cholesterol and blood pressure    Subjective - Objective   There is a history of past MI    There has been a previous stent procedure using TRE    Current antiplatelet therapy includes aspirin 81 mg    2.  Mixed hyperlipidemia- cont lipid lowering therapy, AST, ALT reviewed    3.  Left bundle branch block-intermittent, not present on today's EKG, QRS duration 113 ms        Thank you very much for allowing us to participate in the care of this pleasant patient.  Please don't hesitate to call if I can be of assistance in any way.      Current Outpatient Medications:     aspirin 81 MG EC tablet, Take 1 tablet by mouth Daily., Disp: , Rfl:     atorvastatin (LIPITOR) 40 MG tablet, Take 1 tablet by mouth Daily., Disp: 90 tablet, Rfl: 3    glucose blood (FREESTYLE LITE) test strip, CHECK BLOOD GLUCOSE THREE TIMES DAILY AFTER EVERY MEAL, Disp: 100 each, Rfl: 11    glucose monitor monitoring kit, 1 each 3 (Three) Times a Day. Use as directed. Please check blood sugars 3 times a day after every meal. Please prescribe to pt what insurance will cover. DX:R73.03, Disp: 1 each, Rfl: 0    Lancets (freestyle) lancets, Use as directed. Please check blood sugars 3 times a day after every meal. Please prescribe to pt what insurance will cover. DX:R73.03, Disp: 200 each, Rfl: 12    lisinopril (PRINIVIL,ZESTRIL) 10 MG tablet, TAKE 1 TABLET BY MOUTH EVERY DAY, Disp: 90 tablet, Rfl: 0    metFORMIN ER (GLUCOPHAGE-XR) 500 MG 24 hr tablet, Take 1 tablet by mouth Daily With Breakfast. For treatment of prediabetes, Disp: 90 tablet, Rfl: 3    metoprolol succinate XL (TOPROL-XL) 25 MG 24 hr tablet, Take 1 tablet by mouth Daily., Disp: 90 tablet, Rfl: 3

## 2024-03-01 RX ORDER — ATORVASTATIN CALCIUM 40 MG/1
40 TABLET, FILM COATED ORAL DAILY
Qty: 90 TABLET | Refills: 3 | Status: SHIPPED | OUTPATIENT
Start: 2024-03-01

## 2024-03-01 RX ORDER — METOPROLOL SUCCINATE 25 MG/1
25 TABLET, EXTENDED RELEASE ORAL DAILY
Qty: 90 TABLET | Refills: 3 | Status: SHIPPED | OUTPATIENT
Start: 2024-03-01